# Patient Record
Sex: FEMALE | Employment: FULL TIME | URBAN - METROPOLITAN AREA
[De-identification: names, ages, dates, MRNs, and addresses within clinical notes are randomized per-mention and may not be internally consistent; named-entity substitution may affect disease eponyms.]

---

## 2017-04-15 DIAGNOSIS — Z30.41 USES ORAL CONTRACEPTION: ICD-10-CM

## 2017-04-17 RX ORDER — DROSPIRENONE AND ETHINYL ESTRADIOL 0.03MG-3MG
KIT ORAL
Qty: 28 TABLET | Refills: 0 | OUTPATIENT
Start: 2017-04-17

## 2017-04-17 NOTE — TELEPHONE ENCOUNTER
mark 3-0.03      Last Written Prescription Date:  3/17/17  Last Fill Quantity: 28,   # refills: 0  [place] [facility] [place]:  3/31/16  Future Office visit: none    Pt due for annual, already received a one month extension. Rx denied.

## 2017-04-26 RX ORDER — DROSPIRENONE AND ETHINYL ESTRADIOL 0.03MG-3MG
1 KIT ORAL DAILY
Qty: 84 TABLET | Refills: 0 | Status: SHIPPED | OUTPATIENT
Start: 2017-04-26 | End: 2017-06-01

## 2017-04-26 NOTE — TELEPHONE ENCOUNTER
CARMITA 3-0.03 MG per tablet      Last Written Prescription Date:  3/17/17  Last Fill Quantity: 28 tabs,   # refills: 0  Last Office Visit with [facility] primary care provider:  3/31/16-Annual  Future Office visit: 6/1/17    Pt has already received a one month extension for this Rx, however she did not have an appt scheduled at that time. Pt called today and scheduled appt for 6/1/17. Per [facility] Protocol, 3 month Rx sent to pharmacy (84 tabs/0rf).  Pt informed. She was supposed to start a new pack on Sunday. Informed that she can start the pack today, it may throw her cycle off by a couple days, and to use a back up method of birth control like condoms for a wk, to be cautious recommended use the back up method for a month. Pt verbalized understanding.      Closing encounter.

## 2017-04-26 NOTE — TELEPHONE ENCOUNTER
Patient called to get her refill of birth control. She made an appointment in May to see Dr. Tran. Please call her.

## 2017-05-14 ENCOUNTER — TRANSFERRED RECORDS (OUTPATIENT)
Dept: HEALTH INFORMATION MANAGEMENT | Facility: CLINIC | Age: 27
End: 2017-05-14

## 2017-06-01 ENCOUNTER — OFFICE VISIT (OUTPATIENT)
Dept: OBGYN | Facility: CLINIC | Age: 27
End: 2017-06-01
Payer: COMMERCIAL

## 2017-06-01 VITALS
BODY MASS INDEX: 20.83 KG/M2 | WEIGHT: 125 LBS | HEIGHT: 65 IN | SYSTOLIC BLOOD PRESSURE: 112 MMHG | DIASTOLIC BLOOD PRESSURE: 60 MMHG

## 2017-06-01 DIAGNOSIS — Z11.3 SCREEN FOR STD (SEXUALLY TRANSMITTED DISEASE): ICD-10-CM

## 2017-06-01 DIAGNOSIS — Z01.419 ENCOUNTER FOR GYNECOLOGICAL EXAMINATION WITHOUT ABNORMAL FINDING: Primary | ICD-10-CM

## 2017-06-01 DIAGNOSIS — Z71.85 COUNSELING FOR HPV (HUMAN PAPILLOMAVIRUS) VACCINATION: ICD-10-CM

## 2017-06-01 DIAGNOSIS — Z30.41 USES ORAL CONTRACEPTION: ICD-10-CM

## 2017-06-01 PROCEDURE — 99395 PREV VISIT EST AGE 18-39: CPT | Performed by: OBSTETRICS & GYNECOLOGY

## 2017-06-01 PROCEDURE — 87491 CHLMYD TRACH DNA AMP PROBE: CPT | Performed by: OBSTETRICS & GYNECOLOGY

## 2017-06-01 PROCEDURE — 87591 N.GONORRHOEAE DNA AMP PROB: CPT | Performed by: OBSTETRICS & GYNECOLOGY

## 2017-06-01 RX ORDER — DROSPIRENONE AND ETHINYL ESTRADIOL 0.03MG-3MG
1 KIT ORAL DAILY
Qty: 84 TABLET | Refills: 4 | Status: SHIPPED | OUTPATIENT
Start: 2017-06-01 | End: 2018-07-02

## 2017-06-01 ASSESSMENT — PATIENT HEALTH QUESTIONNAIRE - PHQ9: 5. POOR APPETITE OR OVEREATING: MORE THAN HALF THE DAYS

## 2017-06-01 ASSESSMENT — ANXIETY QUESTIONNAIRES
1. FEELING NERVOUS, ANXIOUS, OR ON EDGE: MORE THAN HALF THE DAYS
6. BECOMING EASILY ANNOYED OR IRRITABLE: MORE THAN HALF THE DAYS
3. WORRYING TOO MUCH ABOUT DIFFERENT THINGS: SEVERAL DAYS
2. NOT BEING ABLE TO STOP OR CONTROL WORRYING: SEVERAL DAYS
GAD7 TOTAL SCORE: 10
IF YOU CHECKED OFF ANY PROBLEMS ON THIS QUESTIONNAIRE, HOW DIFFICULT HAVE THESE PROBLEMS MADE IT FOR YOU TO DO YOUR WORK, TAKE CARE OF THINGS AT HOME, OR GET ALONG WITH OTHER PEOPLE: SOMEWHAT DIFFICULT
5. BEING SO RESTLESS THAT IT IS HARD TO SIT STILL: SEVERAL DAYS
7. FEELING AFRAID AS IF SOMETHING AWFUL MIGHT HAPPEN: SEVERAL DAYS

## 2017-06-01 NOTE — PROGRESS NOTES
"  Lorrie is a 27 year old  female who presents for annual exam.     Besides routine health maintenance, she has had a few irregular periods while on the pill.  Does contribute this to her work schedule and taking pill on a irregular schedule.          HPI:    Here today for yearly exam --doing well.  Fairly regular, monthly menses on Ana; typically light and 5-6d bleeding.  Occ breakthrough bleeding --often when she has missed pills but sometimes just randomly.  Has struggled at times to take pills regularily due to busy schedule.  Always using back up birth control/condoms.  +SA --no issues.  Has had some new partners this year.  No concerns.  Denies bowel/bladder issues.    Currently dating --has been with current partner for a few weeks; living at home but looking into moving out this summer to [facility] area  -currently working as [employer] in [place]; will start masters program at [employer] this  in biological sciences; hoping to complete in 2yrs  -staying active; dancing and going to the gym a few times per week  No PCP  -questions about cancer screening; mother had \"cancer\" in her 20-30s?  Unsure of type and age but will check to verify if/when screening should start if available  -has not had gardasil vaccine series --will consider    GYNECOLOGIC HISTORY:    Patient's last menstrual period was 2017.  Her current contraception method is: oral contraceptives.  She  reports that she has never smoked. She has never used smokeless tobacco.  Patient is sexually active.  STD testing offered?  Declined    Last PHQ-9 score on record =   PHQ-9 SCORE 2017   Total Score 5     Last GAD7 score on record =   JOYCE-7 SCORE 2017   Total Score 10     Alcohol Score = 5    HEALTH MAINTENANCE:  Cholesterol: no previous screening  Last Mammo: N/A, Result: not applicable  Pap:   Lab Results   Component Value Date    PAP NIL 2016   Colonoscopy:  N/A, Result: not applicable  Dexa:  Never  Health " "maintenance updated:  yes    HISTORY:  Obstetric History       T0      L0     SAB0   TAB0   Ectopic0   Multiple0   Live Births0           Patient Active Problem List   Diagnosis     Uses oral contraception     Acne     Past Surgical History:   Procedure Laterality Date     NO HISTORY OF SURGERY        Social History   Substance Use Topics     Smoking status: Never Smoker     Smokeless tobacco: Never Used     Alcohol use 0.0 oz/week     0 Standard drinks or equivalent per week      Problem (# of Occurrences) Relation (Name,Age of Onset)    HEART DISEASE (1) Maternal Grandfather    Ovarian Cancer (1) Maternal Grandmother            Current Outpatient Prescriptions   Medication Sig     drospirenone-ethinyl estradiol (CARMITA) 3-0.03 MG per tablet Take 1 tablet by mouth daily     [DISCONTINUED] drospirenone-ethinyl estradiol (CARMITA) 3-0.03 MG per tablet Take 1 tablet by mouth daily     No current facility-administered medications for this visit.      No Known Allergies    Past medical, surgical, social and family histories were reviewed and updated in EPIC.    ROS:   12 point review of systems negative other than symptoms noted below.  Skin: Acne  Psychiatric: Anxiety and [place]    EXAM:  /60  Ht 5' 4.5\" (1.638 m)  Wt 125 lb (56.7 kg)  LMP 2017  BMI 21.12 kg/m2   BMI: Body mass index is 21.12 kg/(m^2).    PHYSICAL EXAM:  Constitutional:  Appearance: Well nourished, well developed, alert, in no acute distress  Neck:  Lymph Nodes:  No lymphadenopathy present    Thyroid:  Gland size normal, nontender, no nodules or masses present  on palpation  Chest:  Respiratory Effort:  Breathing unlabored  Cardiovascular:    Heart: Auscultation:  Regular rate, normal rhythm, no murmurs present  Breasts: Inspection of Breasts:  No lymphadenopathy present    Palpation of Breasts and Axillae:  No masses present on palpation, no  breast tenderness    Axillary Lymph Nodes:  No lymphadenopathy " present  Gastrointestinal:   Abdominal Examination:  Abdomen nontender to palpation, tone normal without rigidity or guarding, no masses present, umbilicus without lesions   Liver and Spleen:  No hepatomegaly present, liver nontender to palpation    Hernias:  No hernias present  Lymphatic: Lymph Nodes:  No other lymphadenopathy present  Skin:  General Inspection:  No rashes present, no lesions present, no areas of  discoloration    Genitalia and Groin:  No rashes present, no lesions present, no areas of  discoloration, no masses present  Neurologic/Psychiatric:    Mental Status:  Oriented X3     Pelvic Exam:  External Genitalia:     Normal appearance for age, no discharge present, no tenderness present, no inflammatory lesions present, color normal  Vagina:     Normal vaginal vault without central or paravaginal defects, no discharge present, no inflammatory lesions present, no masses present  Bladder:     Nontender to palpation  Urethra:   Urethral Body:  Urethra palpation normal, urethra structural support normal   Urethral Meatus:  No erythema or lesions present  Cervix:     Appearance healthy, no lesions present, nontender to palpation, no bleeding present  Uterus:     Uterus: firm, normal sized and nontender, anteverted in position.   Adnexa:     No adnexal tenderness present, no adnexal masses present  Perineum:     Perineum within normal limits, no evidence of trauma, no rashes or skin lesions present  Anus:     Anus within normal limits, no hemorrhoids present  Inguinal Lymph Nodes:     No lymphadenopathy present  Pubic Hair:     Normal pubic hair distribution for age  Genitalia and Groin:     No rashes present, no lesions present, no areas of discoloration, no masses present    COUNSELING:   Reviewed preventive health counseling, as reflected in patient instructions  Special attention given to:        Regular exercise       Healthy diet/nutrition       Contraception       Safe sex practices/STD  prevention    BMI: Body mass index is 21.12 kg/(m^2).      ASSESSMENT:  27 year old female with satisfactory annual exam.    ICD-10-CM    1. Encounter for gynecological examination without abnormal finding Z01.419    2. Uses oral contraception Z30.41 drospirenone-ethinyl estradiol (CARMITA) 3-0.03 MG per tablet   3. Counseling for HPV (human papillomavirus) vaccination Z71.89    4. Screen for STD (sexually transmitted disease) Z11.3 Chlamydia trachomatis PCR     Neisseria gonorrhoeae PCR       PLAN:  Patient Instructions   Follow up with your primary care provider for your other medical problems.  Continue self breast exam.  Increase physical activity and exercise.  Lab results will be called to the patient.  Usual safety and preventative measures counseling done.  Gardasil discussed today.  Will review literature and call if interested.  Last pap smear (2016) was normal.  No pap was obtained this year.  This was discussed with the patient and she agrees with the plan.      Agustina Tran MD

## 2017-06-01 NOTE — PATIENT INSTRUCTIONS
Follow up with your primary care provider for your other medical problems.  Continue self breast exam.  Increase physical activity and exercise.  Lab results will be called to the patient.  Usual safety and preventative measures counseling done.  Gardasil discussed today.  Will review literature and call if interested.  Last pap smear (2016) was normal.  No pap was obtained this year.  This was discussed with the patient and she agrees with the plan.

## 2017-06-01 NOTE — MR AVS SNAPSHOT
After Visit Summary   6/1/2017    Lorrie Yuan    MRN: [id number]           Patient Information     Date Of Birth          1990        Visit Information        Provider Department      6/1/2017 11:00 AM Agustina Tran MD [facility]        Today's Diagnoses     Encounter for gynecological examination without abnormal finding    -  1    Uses oral contraception        Counseling for HPV (human papillomavirus) vaccination        Screen for STD (sexually transmitted disease)          Care Instructions    Follow up with your primary care provider for your other medical problems.  Continue self breast exam.  Increase physical activity and exercise.  Lab results will be called to the patient.  Usual safety and preventative measures counseling done.  Gardasil discussed today.  Will review literature and call if interested.  Last pap smear (2016) was normal.  No pap was obtained this year.  This was discussed with the patient and she agrees with the plan.          Follow-ups after your visit        Follow-up notes from your care team     Return in about 1 year (around 6/1/2018) for Annual Exam.      Who to contact     If you have questions or need follow up information about today's clinic visit or your schedule please contact [facility] directly at [phone].  Normal or non-critical lab and imaging results will be communicated to you by MyChart, letter or phone within 4 business days after the clinic has received the results. If you do not hear from us within 7 days, please contact the clinic through [employer] or phone. If you have a critical or abnormal lab result, we will notify you by phone as soon as possible.  Submit refill requests through [employer] or call your pharmacy and they will forward the refill request to us. Please allow 3 business days for your refill to be completed.          Additional Information About Your Visit        MyChart  "Information     ReplySend lets you send messages to your doctor, view your test results, renew your prescriptions, schedule appointments and more. To sign up, go to [email or web address] . Click on \"Log in\" on the left side of the screen, which will take you to the Welcome page. Then click on \"Sign up Now\" on the right side of the page.     You will be asked to enter the access code listed below, as well as some personal information. Please follow the directions to create your username and password.     Your access code is: [email or web address]  Expires: 2017 12:16 PM     Your access code will  in 90 days. If you need help or a new code, please call your [place] clinic or [phone].        Care EveryWhere ID     This is your Care EveryWhere ID. This could be used by other organizations to access your [place] medical records  [id number]        Your Vitals Were     Height Last Period BMI (Body Mass Index)             5' 4.5\" (1.638 m) 2017 21.12 kg/m2          Blood Pressure from Last 3 Encounters:   17 112/60   16 102/60   14 100/60    Weight from Last 3 Encounters:   17 125 lb (56.7 kg)   16 132 lb (59.9 kg)   14 126 lb (57.2 kg)              We Performed the Following     Chlamydia trachomatis PCR     Neisseria gonorrhoeae PCR          Where to get your medicines      These medications were sent to [facility] [id number] [address] [facility]  [address] [facility] [address]     Phone:  [phone]     drospirenone-ethinyl estradiol 3-0.03 MG per tablet          Primary Care Provider    None Specified       No primary provider on file.        Thank you!     Thank you for choosing [facility]  for your care. Our goal is always to provide you with excellent care. Hearing back from our patients is one way we can continue to improve our services. Please take a few minutes to complete the " written survey that you may receive in the mail after your visit with us. Thank you!             Your Updated Medication List - Protect others around you: Learn how to safely use, store and throw away your medicines at www.disposemymeds.org.          This list is accurate as of: 6/1/17 12:16 PM.  Always use your most recent med list.                   Brand Name Dispense Instructions for use    drospirenone-ethinyl estradiol 3-0.03 MG per tablet    CARMITA    84 tablet    Take 1 tablet by mouth daily

## 2017-06-02 LAB
C TRACH DNA SPEC QL NAA+PROBE: NORMAL
N GONORRHOEA DNA SPEC QL NAA+PROBE: NORMAL
SPECIMEN SOURCE: NORMAL
SPECIMEN SOURCE: NORMAL

## 2017-06-02 ASSESSMENT — PATIENT HEALTH QUESTIONNAIRE - PHQ9: SUM OF ALL RESPONSES TO PHQ QUESTIONS 1-9: 5

## 2017-06-02 ASSESSMENT — ANXIETY QUESTIONNAIRES: GAD7 TOTAL SCORE: 10

## 2017-12-12 ENCOUNTER — OFFICE VISIT (OUTPATIENT)
Dept: OBGYN | Facility: CLINIC | Age: 27
End: 2017-12-12
Payer: COMMERCIAL

## 2017-12-12 VITALS
BODY MASS INDEX: 21.16 KG/M2 | DIASTOLIC BLOOD PRESSURE: 54 MMHG | WEIGHT: 127 LBS | HEIGHT: 65 IN | SYSTOLIC BLOOD PRESSURE: 92 MMHG

## 2017-12-12 DIAGNOSIS — N94.10 FEMALE DYSPAREUNIA: ICD-10-CM

## 2017-12-12 DIAGNOSIS — Z11.3 SCREEN FOR STD (SEXUALLY TRANSMITTED DISEASE): Primary | ICD-10-CM

## 2017-12-12 DIAGNOSIS — Z11.8 SCREENING FOR CHLAMYDIAL DISEASE: ICD-10-CM

## 2017-12-12 PROCEDURE — 99213 OFFICE O/P EST LOW 20 MIN: CPT | Performed by: OBSTETRICS & GYNECOLOGY

## 2017-12-12 PROCEDURE — 87591 N.GONORRHOEAE DNA AMP PROB: CPT | Performed by: OBSTETRICS & GYNECOLOGY

## 2017-12-12 PROCEDURE — 87491 CHLMYD TRACH DNA AMP PROBE: CPT | Performed by: OBSTETRICS & GYNECOLOGY

## 2017-12-12 NOTE — MR AVS SNAPSHOT
"              After Visit Summary   12/12/2017    Lorrie Yuan    MRN: [id number]           Patient Information     Date Of Birth          1990        Visit Information        Provider Department      12/12/2017 3:30 PM Noemy Nolan MD [facility]        Today's Diagnoses     Screen for STD (sexually transmitted disease)    -  1    Screening for chlamydial disease        Female dyspareunia           Follow-ups after your visit        Follow-up notes from your care team     Return if symptoms worsen or fail to improve.      Who to contact     If you have questions or need follow up information about today's clinic visit or your schedule please contact [facility] directly at [phone].  Normal or non-critical lab and imaging results will be communicated to you by MyChart, letter or phone within 4 business days after the clinic has received the results. If you do not hear from us within 7 days, please contact the clinic through MyChart or phone. If you have a critical or abnormal lab result, we will notify you by phone as soon as possible.  Submit refill requests through [employer] or call your pharmacy and they will forward the refill request to us. Please allow 3 business days for your refill to be completed.          Additional Information About Your Visit        MyChart Information     [employer] lets you send messages to your doctor, view your test results, renew your prescriptions, schedule appointments and more. To sign up, go to [email or web address] . Click on \"Log in\" on the left side of the screen, which will take you to the Welcome page. Then click on \"Sign up Now\" on the right side of the page.     You will be asked to enter the access code listed below, as well as some personal information. Please follow the directions to create your username and password.     Your access code is: [email or web address]  Expires: 3/12/2018  3:43 PM     Your access " "code will  in 90 days. If you need help or a new code, please call your [place] clinic or [phone].        Care EveryWhere ID     This is your Care EveryWhere ID. This could be used by other organizations to access your [place] medical records  [id number]        Your Vitals Were     Height Last Period BMI (Body Mass Index)             5' 4.5\" (1.638 m) 2017 21.46 kg/m2          Blood Pressure from Last 3 Encounters:   17 92/54   17 112/60   16 102/60    Weight from Last 3 Encounters:   17 127 lb (57.6 kg)   17 125 lb (56.7 kg)   16 132 lb (59.9 kg)              We Performed the Following     CHLAMYDIA TRACHOMATIS PCR     NEISSERIA GONORRHOEA PCR        Primary Care Provider Fax #    Physician No Ref-Primary [phone]       No address on file        Equal Access to Services     KELLY IBARRA : Hadii diana welsho Sogiovanny, waaxda luqadaha, qaybta kaalmada adeegyada, rosalba gasca . So [facility] [phone].    ATENCIÓN: Si jens wilks, tiene a mcleod disposición servicios gratuitos de asistencia lingüística. Llame al [phone].    We comply with applicable federal civil rights laws and [place] laws. We do not discriminate on the basis of race, color, national origin, age, disability, sex, sexual orientation, or gender identity.            Thank you!     Thank you for choosing [facility]  for your care. Our goal is always to provide you with excellent care. Hearing back from our patients is one way we can continue to improve our services. Please take a few minutes to complete the written survey that you may receive in the mail after your visit with us. Thank you!             Your Updated Medication List - Protect others around you: Learn how to safely use, store and throw away your medicines at www.disposemymeds.org.          This list is accurate as of: 17  5:23 PM.  Always use your most recent med list.    "                Brand Name Dispense Instructions for use Diagnosis    drospirenone-ethinyl estradiol 3-0.03 MG per tablet    CARMITA    84 tablet    Take 1 tablet by mouth daily    Uses oral contraception

## 2017-12-12 NOTE — PROGRESS NOTES
SUBJECTIVE:                                                   Lorrie Yuan is a 27 year old female who presents to clinic today for the following health issue(s):  Patient presents with:  STD: patient requesting screening        HPI:  Lorrie presents for GC/CT testing. She has no vaginal discharge but she has a new partner. She is hesitant to have serological STD testing done today. She states that she has continued to have painful intercourse on initial penetration. This occurred with other partners as well. Sometimes it is only on initial penetration but sometimes it is also throughout intercourse as well. She states that she also has been experiencing vaginal dryness as well.    Patient's last menstrual period was 2017..   Patient is sexually active, .  Using oral contraceptives for contraception.    reports that she has never smoked. She has never used smokeless tobacco.  STD testing offered?  Accepted  Health maintenance updated:  yes    Today's PHQ-2 Score: No flowsheet data found.  Today's PHQ-9 Score:   PHQ-9 SCORE 2017   Total Score 5     Today's JOYCE-7 Score:   JOYCE-7 SCORE 2017   Total Score 10       Problem list and histories reviewed & adjusted, as indicated.  Additional history: as documented.    Patient Active Problem List   Diagnosis     Uses oral contraception     Acne     Past Surgical History:   Procedure Laterality Date     NO HISTORY OF SURGERY        Social History   Substance Use Topics     Smoking status: Never Smoker     Smokeless tobacco: Never Used     Alcohol use 0.0 oz/week     0 Standard drinks or equivalent per week      Problem (# of Occurrences) Relation (Name,Age of Onset)    HEART DISEASE (1) Maternal Grandfather    Ovarian Cancer (1) Maternal Grandmother            Current Outpatient Prescriptions   Medication Sig     drospirenone-ethinyl estradiol (CARMITA) 3-0.03 MG per tablet Take 1 tablet by mouth daily     No current facility-administered  "medications for this visit.      No Known Allergies    ROS:  12 point review of systems negative other than symptoms noted below.  Genitourinary: Painful [place]    OBJECTIVE:     BP 92/54  Ht 5' 4.5\" (1.638 m)  Wt 127 lb (57.6 kg)  LMP 12/08/2017  BMI 21.46 kg/m2  Body mass index is 21.46 kg/(m^2).    Exam:  Constitutional:  Appearance: Well nourished, well developed alert, in no acute distress  Chest:  Respiratory Effort:  Breathing unlabored  Cardiovascular: well perfused extremities, no peripheral edema  Gastrointestinal:  Abdominal Examination:  Abdomen nontender to palpation, tone normal without rigidity or guarding, no masses present, umbilicus without lesions; Liver/Spleen:  No hepatomegaly present, liver nontender to palpation; Hernias:  No hernias present  Neurologic/Psychiatric:  Mental Status:  Oriented X3    Pelvic Exam  Normal external genitalia  No visible lesions externally  Vagina: no abnormal vaginal discharge noted. Tender to palpation of posterior vaginal wall. No anterior vaginal wall tenderness  Cervix: appears normal without lesions, GC/CT collected. No purulent discharge noted. No cervical motion tenderness  Uterus: non-tender on bimanual. Mobile anteverted.  Adnexa: non-tender. No masses    In-Clinic Test Results:  No results found for this or any previous visit (from the past 24 hour(s)).    ASSESSMENT/PLAN:                                                        ICD-10-CM    1. Screen for STD (sexually transmitted disease) Z11.3 NEISSERIA GONORRHOEA PCR   2. Screening for chlamydial disease Z11.8 CHLAMYDIA TRACHOMATIS PCR   3. Female dyspareunia N94.10      Recommendation was given for serological STI testing but she declines at this time.  HPV vaccination was recommended but she also declines  Discussed proper use of vaginal lubrication. Exam showed pain on palpation of pelvic floor which is likely reactionary from painful intercourse. This was explained to Lorrie. Pelvic floor " PT was discussed as an option if the discomfort persists despite lubricant use.      Noemy Nolan MD  [facility]

## 2017-12-14 LAB
C TRACH DNA SPEC QL NAA+PROBE: NEGATIVE
N GONORRHOEA DNA SPEC QL NAA+PROBE: NEGATIVE
SPECIMEN SOURCE: NORMAL
SPECIMEN SOURCE: NORMAL

## 2018-04-06 ENCOUNTER — OFFICE VISIT (OUTPATIENT)
Dept: OBGYN | Facility: CLINIC | Age: 28
End: 2018-04-06
Payer: COMMERCIAL

## 2018-04-06 VITALS — DIASTOLIC BLOOD PRESSURE: 66 MMHG | WEIGHT: 129 LBS | SYSTOLIC BLOOD PRESSURE: 106 MMHG | BODY MASS INDEX: 21.8 KG/M2

## 2018-04-06 DIAGNOSIS — B37.31 YEAST VAGINITIS: ICD-10-CM

## 2018-04-06 DIAGNOSIS — N89.8 VAGINAL IRRITATION: Primary | ICD-10-CM

## 2018-04-06 LAB
SPECIMEN SOURCE: ABNORMAL
WET PREP SPEC: ABNORMAL

## 2018-04-06 PROCEDURE — 87210 SMEAR WET MOUNT SALINE/INK: CPT | Performed by: OBSTETRICS & GYNECOLOGY

## 2018-04-06 PROCEDURE — 99213 OFFICE O/P EST LOW 20 MIN: CPT | Performed by: OBSTETRICS & GYNECOLOGY

## 2018-04-06 RX ORDER — MICONAZOLE NITRATE 2 %
1 CREAM WITH APPLICATOR VAGINAL AT BEDTIME
Qty: 45 G | Refills: 1 | Status: SHIPPED | OUTPATIENT
Start: 2018-04-06 | End: 2018-08-07

## 2018-04-06 NOTE — MR AVS SNAPSHOT
"              After Visit Summary   4/6/2018    Lorrie Yuan    MRN: [id number]           Patient Information     Date Of Birth          1990        Visit Information        Provider Department      4/6/2018 2:30 PM Agustina Tran MD [facility]        Today's Diagnoses     Vaginal irritation    -  1    Yeast vaginitis          Care Instructions    Wet prep results reviewed; will treat with 7d course of topical medication.            Follow-ups after your visit        Follow-up notes from your care team     Return if symptoms worsen or fail to improve.      Who to contact     If you have questions or need follow up information about today's clinic visit or your schedule please contact [facility] directly at [phone].  Normal or non-critical lab and imaging results will be communicated to you by MyChart, letter or phone within 4 business days after the clinic has received the results. If you do not hear from us within 7 days, please contact the clinic through [email or web address] or phone. If you have a critical or abnormal lab result, we will notify you by phone as soon as possible.  Submit refill requests through [employer] or call your pharmacy and they will forward the refill request to us. Please allow 3 business days for your refill to be completed.          Additional Information About Your Visit        MyChart Information     [facility] [facility] lets you send messages to your doctor, view your test results, renew your prescriptions, schedule appointments and more. To sign up, go to [email or web address] . Click on \"Log in\" on the left side of the screen, which will take you to the Welcome page. Then click on \"Sign up Now\" on the right side of the page.     You will be asked to enter the access code listed below, as well as some personal information. Please follow the directions to create your username and password.     Your access code is: [email or web address]  Expires: 7/5/2018  " 4:59 PM     Your access code will  in 90 days. If you need help or a new code, please call your [facility] clinic or [phone].        Care EveryWhere ID     This is your Care EveryWhere ID. This could be used by other organizations to access your [facility] medical records  [id number]        Your Vitals Were     Last Period Breastfeeding? BMI (Body Mass Index)             2018 (Exact Date) No 21.8 kg/m2          Blood Pressure from Last 3 Encounters:   18 106/66   17 92/54   17 112/60    Weight from Last 3 Encounters:   18 129 lb (58.5 kg)   17 127 lb (57.6 kg)   17 125 lb (56.7 kg)              We Performed the Following     Wet  Prep          Today's Medication Changes          These changes are accurate as of 18  4:59 PM.  If you have any questions, ask your nurse or doctor.               Start taking these medicines.        Dose/Directions    miconazole 2 % cream   Commonly known as:  [facility] MICONAZOLE 7   Used for:  Yeast vaginitis   Started by:  Agustina Tran MD        Dose:  1 applicator   Place 1 applicator vaginally At Bedtime   Quantity:  45 g   Refills:  1            Where to get your medicines      These medications were sent to [facility] [address] [facility] [address] AT [facility] [address] [facility] [address]     Phone:  [phone]     miconazole 2 % cream                Primary Care Provider Office Phone # Fax #    [facility] [place] [facility] [phone]       [facility] [place] [address] [facility] [address]  [facility] [address]        Equal Access to Services     KELLY IBARRA AH: Denzel June, devante cohen, donald mo, rosalba whitman. So [facility] [phone].    ATENCIÓN: Si habla español, tiene a mcleod disposición servicios gratuitos de asistencia lingüística. Llame al [phone].    We comply  with applicable federal civil rights laws and [place] laws. We do not discriminate on the basis of race, color, national origin, age, disability, sex, sexual orientation, or gender identity.            Thank you!     Thank you for choosing [facility]  for your care. Our goal is always to provide you with excellent care. Hearing back from our patients is one way we can continue to improve our services. Please take a few minutes to complete the written survey that you may receive in the mail after your visit with us. Thank you!             Your Updated Medication List - Protect others around you: Learn how to safely use, store and throw away your medicines at www.disposemymeds.org.          This list is accurate as of 4/6/18  4:59 PM.  Always use your most recent med list.                   Brand Name Dispense Instructions for use Diagnosis    drospirenone-ethinyl estradiol 3-0.03 MG per tablet    CARMITA    84 tablet    Take 1 tablet by mouth daily    Uses oral contraception       miconazole 2 % cream    CVS MICONAZOLE 7    45 g    Place 1 applicator vaginally At Bedtime    Yeast vaginitis

## 2018-04-06 NOTE — PROGRESS NOTES
SUBJECTIVE:                                                   Lorrie Yuan is a 27 year old female who presents to clinic today for the following health issue(s):  Patient presents with:  Vaginal Problem: vaginal discharge, itching, painful urination since wednesday        HPI:  Here today for possible vaginal infection.  Finished her menses on Tuesday and started noticing some external irritation on Wednesday.  +itching/burning.  Not much discharge but hard to tell since she just finished menses.  No pain/burning with urination --painful when urine comes into contact with vulvar tissue.   No self or OTC treatments  No new products; no new soaps/creams.  Wears tampons and then liner on last day or so of menses  Monogamous relationship since 2017    Patient's last menstrual period was 2018 (exact date)..   Patient is sexually active, .  Using oral contraceptives for contraception.    reports that she has never smoked. She has never used smokeless tobacco.    STD testing offered?  Declined    Health maintenance updated:  yes    Today's PHQ-2 Score: No flowsheet data found.  Today's PHQ-9 Score:   PHQ-9 SCORE 2017   Total Score 5     Today's JOYCE-7 Score:   JOYCE-7 SCORE 2017   Total Score 10       Problem list and histories reviewed & adjusted, as indicated.  Additional history: as documented.    Patient Active Problem List   Diagnosis     Uses oral contraception     Acne     Past Surgical History:   Procedure Laterality Date     NO HISTORY OF SURGERY        Social History   Substance Use Topics     Smoking status: Never Smoker     Smokeless tobacco: Never Used     Alcohol use 0.0 oz/week     0 Standard drinks or equivalent per week      Problem (# of Occurrences) Relation (Name,Age of Onset)    HEART DISEASE (1) Maternal Grandfather    Ovarian Cancer (1) Maternal Grandmother            Current Outpatient Prescriptions   Medication Sig     miconazole (CVS MICONAZOLE 7) 2 % cream Place 1  applicator vaginally At Bedtime     drospirenone-ethinyl estradiol (CARMITA) 3-0.03 MG per tablet Take 1 tablet by mouth daily     No current facility-administered medications for this visit.      No Known Allergies    ROS:  Genitourinary: Painful Urination, Vaginal Discharge and Vaginal Itching    OBJECTIVE:     /66  Wt 129 lb (58.5 kg)  LMP 03/31/2018 (Exact Date)  Breastfeeding? No  BMI 21.8 kg/m2  Body mass index is 21.8 kg/(m^2).    Exam:  Constitutional:  Appearance: Well nourished, well developed alert, in no acute distress  Skin:General Inspection:  No rashes present, no lesions present, no areas of discoloration; Genitalia and Groin:  No rashes present, no lesions present, no areas of discoloration, no masses present.  Neurologic/Psychiatric:  Mental Status:  Oriented X3   Pelvic Exam:  External Genitalia:     Normal appearance for age, no discharge present, no tenderness present, no inflammatory lesions present, color normal; +REDNESS/IRRITATION OF VULVA; BILATERAL LABIA AND POSTERIOR FOURCHETTE; +ERYTHEMATOUS PATCHES/SATELLITE LESIONS; +THICK WHITE ADHERENT DISCHARGE  Vagina:     Normal vaginal vault without central or paravaginal defects, no discharge present, no inflammatory lesions present, no masses present; SMALL AMOUNT WHITE/YELLOW DISCHARGE  Bladder:     Nontender to palpation  Urethra:   Urethral Body:  Urethra palpation normal, urethra structural support normal   Urethral Meatus:  No erythema or lesions present  Cervix:     Appearance healthy, no lesions present, nontender to palpation, no bleeding present  Uterus:     DEFERRED  Adnexa:     DEFERRED  Perineum:     Perineum within normal limits, no evidence of trauma, no rashes or skin lesions present  Anus:     Anus within normal limits, no hemorrhoids present  Inguinal Lymph Nodes:     No lymphadenopathy present  Pubic Hair:     Normal pubic hair distribution for age  Genitalia and Groin:     No rashes present, no lesions present, no  areas of discoloration, no masses present       In-Clinic Test Results:  Results for orders placed or performed in visit on 04/06/18 (from the past 24 hour(s))   Wet  Prep   Result Value Ref Range    Specimen Description Vagina     Wet Prep No Trichomonas seen     Wet Prep No clue cells seen     Wet Prep Yeast seen (A)        ASSESSMENT/PLAN:                                                        ICD-10-CM    1. Vaginal irritation N89.8 Wet  Prep   2. Yeast vaginitis B37.3 miconazole (CVS MICONAZOLE 7) 2 % cream       Patient Instructions   Wet prep results reviewed; will treat with 7d course of topical medication.        Agustina Tran MD  [facility] [place]

## 2018-04-09 ENCOUNTER — TELEPHONE (OUTPATIENT)
Dept: NURSING | Facility: CLINIC | Age: 28
End: 2018-04-09

## 2018-04-09 NOTE — TELEPHONE ENCOUNTER
Pt seen 4/6/18 for vaginal irritation. Started on miconazole (CVS MICONAZOLE 7) 2 % cream on friday. Pt woke this morning with sharp RLQ abd pain. States area is tender to touch.  Pt also had large amount of white clumpy discharge. Denies any smell to discharge. Pt denies itching or pain with urination. Pt wants to know if OK to continue taking med or what she should do. Routing to Dr. Meneses, on call.

## 2018-04-09 NOTE — TELEPHONE ENCOUNTER
Contacted the pt. Reviewed Dr Meneses note below. Pt voices understanding. She said the pain has lessed since this am. She will contact the clinic if her symptoms do not improve by Wedneday.

## 2018-04-09 NOTE — TELEPHONE ENCOUNTER
Continue treatment as prescribed. RLQ pain not likely related to treatment but incidental. Monitor, treat symptomatically with heat/otc meds.    Felicity Nuñez Masters, DO

## 2018-05-11 ENCOUNTER — TELEPHONE (OUTPATIENT)
Dept: OBGYN | Facility: CLINIC | Age: 28
End: 2018-05-11

## 2018-05-11 NOTE — TELEPHONE ENCOUNTER
Pt called regarding a missed period while on OCP's. LMP: 3/31/2018, missed a few days here and there on her OCP this last month and has been stressed with school and over her last final Friday. She missed her period 4/30/2018, took a UPT last week and then Friday started with low abdominal pain similar to menstrual cramps which continues today along with breast tenderness and still without a period.     Recommended she take another UPT in the morning. May try heat and/or take Tylenol for discomfort, but may try ibuprofen if UPT negative. Missed period may be a result of the inconsistency in her OCP's last month and the stress she is experiencing. If UPT is negative, and pain persists and/or worsens, she may call back. If UPT is positive, these sx's could certainly be a result of the pregnancy.    She verbalized understanding, no further questions at this time.    Josi AVILA RN

## 2018-05-14 ENCOUNTER — TELEPHONE (OUTPATIENT)
Dept: OBGYN | Facility: CLINIC | Age: 28
End: 2018-05-14

## 2018-05-14 NOTE — TELEPHONE ENCOUNTER
Would like a call back from nurse Josi - spoke with her on Friday - calling with further questions

## 2018-05-14 NOTE — TELEPHONE ENCOUNTER
Pt called back in regards to our conversation from 5/11/2018. She took a repeat UPT on Saturday morning which was positive.  Her LMP: 3/30/2018 places her at 6w3d. Has been nauseated and vomiting a few times and inquiring what she can take with pregnancy. Informed of Vit B6 and Unisom regime per the [facility] Taking Meds during pregnancy. Encouraged protein, fiber and fluids. Discussed things to avoid for a healthy pregnancy. She had lots of appropriate questions which she verbalized understanding.     [facility] and spoke with someone for an hour on her options as she and her boyfriend are unsure if they will continue with the pregnancy as this is an unplanned pregnancy. Support provided by this nurse as pt became tearful. She is taking the week to decide.    Informed to make her first OB appt at 8-10 weeks of pregnancy by calling the office if she plans to go forward with pregnancy. No further questions/concerns at this time.  Josi AVILA RN

## 2018-07-02 DIAGNOSIS — Z30.41 USES ORAL CONTRACEPTION: ICD-10-CM

## 2018-07-02 NOTE — TELEPHONE ENCOUNTER
"Patient called [facility] after clinic hours to say \"she is NOT pregnant\". Phone number [phone].  Maria G Flores RN  [facility]      "

## 2018-07-02 NOTE — TELEPHONE ENCOUNTER
Pt has appointment for her annual 8/7/18. Tried to call pt to confirm that she is not pregnant. Mailbox is full not able to leave message. Will try later.

## 2018-07-03 RX ORDER — DROSPIRENONE AND ETHINYL ESTRADIOL 0.03MG-3MG
1 KIT ORAL DAILY
Qty: 84 TABLET | Refills: 0 | Status: SHIPPED | OUTPATIENT
Start: 2018-07-03 | End: 2018-08-07

## 2018-07-03 NOTE — TELEPHONE ENCOUNTER
"Requested Prescriptions   Pending Prescriptions Disp Refills     drospirenone-ethinyl estradiol (CARMITA) 3-0.03 MG per tablet 84 tablet 0     Sig: Take 1 tablet by mouth daily  Last Written Prescription Date:  6/1/17  Last Fill Quantity: 84,  # refills: 4   Last office visit: 4/6/2018 with prescribing provider:  Dr. Tran   Future Office Visit:   Next 5 appointments (look out 90 days)     Aug 07, 2018  3:00 PM CDT   PHYSICAL with Agustina Tran MD   [facility]    [address] [place] [id number]   [phone]                     Contraceptives Protocol Passed    7/3/2018  2:31 PM       Passed - Patient is not a current smoker if age is 35 or older       Passed - Recent (12 mo) or future (30 days) visit within the authorizing provider's specialty    Patient had office visit in the last 12 months or has a visit in the next 30 days with authorizing provider or within the authorizing provider's specialty.  See \"Patient Info\" tab in inbasket, or \"Choose Columns\" in Meds & Orders section of the refill encounter.           Passed - No active pregnancy on record       Passed - No positive pregnancy test in past 12 months        Prescription approved per [facility] Refill Protocol, with no further refills. Annual scheduled.  "

## 2018-08-07 ENCOUNTER — OFFICE VISIT (OUTPATIENT)
Dept: OBGYN | Facility: CLINIC | Age: 28
End: 2018-08-07
Payer: COMMERCIAL

## 2018-08-07 VITALS
HEIGHT: 64 IN | SYSTOLIC BLOOD PRESSURE: 98 MMHG | BODY MASS INDEX: 20.49 KG/M2 | WEIGHT: 120 LBS | HEART RATE: 68 BPM | DIASTOLIC BLOOD PRESSURE: 64 MMHG

## 2018-08-07 DIAGNOSIS — Z30.41 USES ORAL CONTRACEPTION: ICD-10-CM

## 2018-08-07 DIAGNOSIS — Z01.419 ENCOUNTER FOR GYNECOLOGICAL EXAMINATION WITHOUT ABNORMAL FINDING: Primary | ICD-10-CM

## 2018-08-07 PROCEDURE — 99395 PREV VISIT EST AGE 18-39: CPT | Performed by: OBSTETRICS & GYNECOLOGY

## 2018-08-07 RX ORDER — DROSPIRENONE AND ETHINYL ESTRADIOL 0.03MG-3MG
1 KIT ORAL DAILY
Qty: 84 TABLET | Refills: 4 | Status: SHIPPED | OUTPATIENT
Start: 2018-08-07 | End: 2019-08-11

## 2018-08-07 ASSESSMENT — ANXIETY QUESTIONNAIRES
3. WORRYING TOO MUCH ABOUT DIFFERENT THINGS: NOT AT ALL
2. NOT BEING ABLE TO STOP OR CONTROL WORRYING: NOT AT ALL
1. FEELING NERVOUS, ANXIOUS, OR ON EDGE: NOT AT ALL
6. BECOMING EASILY ANNOYED OR IRRITABLE: NOT AT ALL
5. BEING SO RESTLESS THAT IT IS HARD TO SIT STILL: NOT AT ALL
GAD7 TOTAL SCORE: 0
7. FEELING AFRAID AS IF SOMETHING AWFUL MIGHT HAPPEN: NOT AT ALL

## 2018-08-07 ASSESSMENT — PATIENT HEALTH QUESTIONNAIRE - PHQ9: 5. POOR APPETITE OR OVEREATING: NOT AT ALL

## 2018-08-07 NOTE — PROGRESS NOTES
Lorrie is a 28 year old  female who presents for annual exam.     Besides routine health maintenance, she has no other health concerns today .    HPI:  Here today for yearly exam --doing well.  Regular, monthly menses with mark OCP.  Light menses and minimal cramping.  No intermenstrual bleeding/spotting.  Did have unplanned pregnancy in early May --opted for termination.  Procedure went well --did not have  menses but had normal period last month.  +SA --no issues/concerns.  Declines STD screening today.  Denies bowel/bladder issues.    Dating; works as /assistant at [employer]  -staying active; dancing twice per week and generally quite active at work  +SBE on occ --no concerns  -originally from [place]; moved with her parents in ; traveled to [place] [place] in  x 10d --had great trip!      GYNECOLOGIC HISTORY:    Patient's last menstrual period was 2018.  Her current contraception method is: oral contraceptives.  She  reports that she has never smoked. She has never used smokeless tobacco.    Patient is sexually active.  STD testing offered?  Declined  Last PHQ-9 score on record =   PHQ-9 SCORE 2018   Total Score 2     Last GAD7 score on record =   JOYCE-7 SCORE 2018   Total Score 0     Alcohol Score = 3    HEALTH MAINTENANCE:  Cholesterol: never  Last Mammo: never, Result: not applicable, Next Mammo:  Age 42  Pap:   Lab Results   Component Value Date    PAP NIL 2016      Colonoscopy:  never, Result: not applicable, Next Colonoscopy: age 52.  Dexa:  never    Health maintenance updated:  yes    HISTORY:  Obstetric History       T0      L0     SAB0   TAB0   Ectopic0   Multiple0   Live Births0       # Outcome Date GA Lbr Kenny/2nd Weight Sex Delivery Anes PTL Lv   1 AB                   Patient Active Problem List   Diagnosis     Uses oral contraception     Acne     Past Surgical History:   Procedure Laterality Date     NO HISTORY OF  "SURGERY        Social History   Substance Use Topics     Smoking status: Never Smoker     Smokeless tobacco: Never Used     Alcohol use 0.0 oz/week     0 Standard drinks or equivalent per week      Problem (# of Occurrences) Relation (Name,Age of Onset)    HEART DISEASE (1) Maternal Grandfather    Ovarian Cancer (1) Maternal Grandmother            Current Outpatient Prescriptions   Medication Sig     drospirenone-ethinyl estradiol (CARMITA) 3-0.03 MG per tablet Take 1 tablet by mouth daily     [DISCONTINUED] drospirenone-ethinyl estradiol (CARMITA) 3-0.03 MG per tablet Take 1 tablet by mouth daily     No current facility-administered medications for this visit.      No Known Allergies    Past medical, surgical, social and family histories were reviewed and updated in EPIC.    ROS:   12 point review of systems negative other than symptoms noted below.    EXAM:  BP 98/64  Pulse 68  Ht 5' 4\" (1.626 m)  Wt 120 lb (54.4 kg)  LMP 07/13/2018  BMI 20.6 kg/m2   BMI: Body mass index is 20.6 kg/(m^2).    PHYSICAL EXAM:  Constitutional:  Appearance: Well nourished, well developed, alert, in no acute distress  Neck:  Lymph Nodes:  No lymphadenopathy present    Thyroid:  Gland size normal, nontender, no nodules or masses present  on palpation  Chest:  Respiratory Effort:  Breathing unlabored  Cardiovascular:    Heart: Auscultation:  Regular rate, normal rhythm, no murmurs present  Breasts: Inspection of Breasts:  No lymphadenopathy present., Palpation of Breasts and Axillae:  No masses present on palpation, no breast tenderness., Axillary Lymph Nodes:  No lymphadenopathy present. and No nodularity, asymmetry or nipple discharge bilaterally.  Gastrointestinal:   Abdominal Examination:  Abdomen nontender to palpation, tone normal without rigidity or guarding, no masses present, umbilicus without lesions   Liver and Spleen:  No hepatomegaly present, liver nontender to palpation    Hernias:  No hernias present  Lymphatic: Lymph " Nodes:  No other lymphadenopathy present  Skin:  General Inspection:  No rashes present, no lesions present, no areas of  discoloration    Genitalia and Groin:  No rashes present, no lesions present, no areas of  discoloration, no masses present  Neurologic/Psychiatric:    Mental Status:  Oriented X3     Pelvic Exam:  External Genitalia:     Normal appearance for age, no discharge present, no tenderness present, no inflammatory lesions present, color normal  Vagina:     Normal vaginal vault without central or paravaginal defects, no discharge present, no inflammatory lesions present, no masses present  Bladder:     Nontender to palpation  Urethra:   Urethral Body:  Urethra palpation normal, urethra structural support normal   Urethral Meatus:  No erythema or lesions present  Cervix:     Appearance healthy, no lesions present, nontender to palpation, no bleeding present  Uterus:     Uterus: firm, normal sized and nontender, anteverted in position.   Adnexa:     No adnexal tenderness present, no adnexal masses present  Perineum:     Perineum within normal limits, no evidence of trauma, no rashes or skin lesions present  Anus:     Anus within normal limits, no hemorrhoids present  Inguinal Lymph Nodes:     No lymphadenopathy present  Pubic Hair:     Normal pubic hair distribution for age  Genitalia and Groin:     No rashes present, no lesions present, no areas of discoloration, no masses present      COUNSELING:   Reviewed preventive health counseling, as reflected in patient instructions  Special attention given to:        Regular exercise       Healthy diet/nutrition       Contraception       Safe sex practices/STD prevention    BMI: Body mass index is 20.6 kg/(m^2).      ASSESSMENT:  28 year old female with satisfactory annual exam.    ICD-10-CM    1. Encounter for gynecological examination without abnormal finding Z01.419    2. Uses oral contraception Z30.41 drospirenone-ethinyl estradiol (CARMITA) 3-0.03 MG per  tablet       PLAN:  Patient Instructions   Follow up with your primary care provider for your other medical problems.  Continue self breast exam.  Increase physical activity and exercise.  Usual safety and preventative measures counseling done.  Last pap smear (2016) was normal.  No pap was obtained this year.  This was discussed with the patient and she agrees with the plan.      Agustina Tran MD

## 2018-08-07 NOTE — PATIENT INSTRUCTIONS
Follow up with your primary care provider for your other medical problems.  Continue self breast exam.  Increase physical activity and exercise.  Usual safety and preventative measures counseling done.  Last pap smear (2016) was normal.  No pap was obtained this year.  This was discussed with the patient and she agrees with the plan.

## 2018-08-07 NOTE — MR AVS SNAPSHOT
"              After Visit Summary   8/7/2018    Lorrie Yuan    MRN: [id number]           Patient Information     Date Of Birth          1990        Visit Information        Provider Department      8/7/2018 3:00 PM Agustina Tran MD [facility]        Today's Diagnoses     Encounter for gynecological examination without abnormal finding    -  1    Uses oral contraception          Care Instructions    Follow up with your primary care provider for your other medical problems.  Continue self breast exam.  Increase physical activity and exercise.  Usual safety and preventative measures counseling done.  Last pap smear (2016) was normal.  No pap was obtained this year.  This was discussed with the patient and she agrees with the plan.          Follow-ups after your visit        Follow-up notes from your care team     Return in about 1 year (around 8/7/2019) for Annual Exam.      Who to contact     If you have questions or need follow up information about today's clinic visit or your schedule please contact [facility] directly at [phone].  Normal or non-critical lab and imaging results will be communicated to you by EngineLabhart, letter or phone within 4 business days after the clinic has received the results. If you do not hear from us within 7 days, please contact the clinic through BoardProspectst or phone. If you have a critical or abnormal lab result, we will notify you by phone as soon as possible.  Submit refill requests through galaxyadvisors or call your pharmacy and they will forward the refill request to us. Please allow 3 business days for your refill to be completed.          Additional Information About Your Visit        MyChart Information     galaxyadvisors lets you send messages to your doctor, view your test results, renew your prescriptions, schedule appointments and more. To sign up, go to [email or web address] . Click on \"Log in\" on the left side of the screen, " "which will take you to the Welcome page. Then click on \"Sign up Now\" on the right side of the page.     You will be asked to enter the access code listed below, as well as some personal information. Please follow the directions to create your username and password.     Your access code is: [email or web address]  Expires: 2018  2:56 PM     Your access code will  in 90 days. If you need help or a new code, please call your [place] clinic or [phone].        Care EveryWhere ID     This is your Care EveryWhere ID. This could be used by other organizations to access your [place] medical records  [id number]        Your Vitals Were     Pulse Height Last Period BMI (Body Mass Index)          68 5' 4\" (1.626 m) 2018 20.6 kg/m2         Blood Pressure from Last 3 Encounters:   18 98/64   18 106/66   17 92/54    Weight from Last 3 Encounters:   18 120 lb (54.4 kg)   18 129 lb (58.5 kg)   17 127 lb (57.6 kg)              Today, you had the following     No orders found for display         Where to get your medicines      These medications were sent to [facility] [address] [facility] [address] AT formerly [facility] & [address], [facility] [address]     Phone:  [phone]     drospirenone-ethinyl estradiol 3-0.03 MG per tablet          Primary Care Provider Office Phone # Fax #    [facility] [phone]       [facility] [address] [facility] [address]  [facility] [address]        Equal Access to Services     KELLY IBARRA : Denzel June, waaaliyahda luqzhang, qaybta kaalmaoli mo, rosalba whitman. So [facility] [phone].    ATENCIÓN: Si habla español, tiene a mcleod disposición servicios gratuitos de asistencia lingüística. Llame al [phone].    We comply with applicable federal civil rights laws and [place] laws. We do not discriminate " on the basis of race, color, national origin, age, disability, sex, sexual orientation, or gender identity.            Thank you!     Thank you for choosing [facility]  for your care. Our goal is always to provide you with excellent care. Hearing back from our patients is one way we can continue to improve our services. Please take a few minutes to complete the written survey that you may receive in the mail after your visit with us. Thank you!             Your Updated Medication List - Protect others around you: Learn how to safely use, store and throw away your medicines at www.disposemymeds.org.          This list is accurate as of 8/7/18  5:03 PM.  Always use your most recent med list.                   Brand Name Dispense Instructions for use Diagnosis    drospirenone-ethinyl estradiol 3-0.03 MG per tablet    CARMITA    84 tablet    Take 1 tablet by mouth daily    Uses oral contraception

## 2018-08-08 ASSESSMENT — ANXIETY QUESTIONNAIRES: GAD7 TOTAL SCORE: 0

## 2018-08-08 ASSESSMENT — PATIENT HEALTH QUESTIONNAIRE - PHQ9: SUM OF ALL RESPONSES TO PHQ QUESTIONS 1-9: 2

## 2018-11-06 ENCOUNTER — OFFICE VISIT (OUTPATIENT)
Dept: OBGYN | Facility: CLINIC | Age: 28
End: 2018-11-06
Payer: COMMERCIAL

## 2018-11-06 VITALS
WEIGHT: 119.6 LBS | DIASTOLIC BLOOD PRESSURE: 50 MMHG | HEIGHT: 64 IN | SYSTOLIC BLOOD PRESSURE: 98 MMHG | BODY MASS INDEX: 20.42 KG/M2

## 2018-11-06 DIAGNOSIS — N89.8 ITCHING OF VAGINA: Primary | ICD-10-CM

## 2018-11-06 LAB
SPECIMEN SOURCE: NORMAL
WET PREP SPEC: NORMAL

## 2018-11-06 PROCEDURE — 87102 FUNGUS ISOLATION CULTURE: CPT | Performed by: OBSTETRICS & GYNECOLOGY

## 2018-11-06 PROCEDURE — 99213 OFFICE O/P EST LOW 20 MIN: CPT | Performed by: OBSTETRICS & GYNECOLOGY

## 2018-11-06 PROCEDURE — 87210 SMEAR WET MOUNT SALINE/INK: CPT | Performed by: OBSTETRICS & GYNECOLOGY

## 2018-11-06 RX ORDER — CLOBETASOL PROPIONATE 0.5 MG/G
OINTMENT TOPICAL
Qty: 45 G | Refills: 0 | Status: SHIPPED | OUTPATIENT
Start: 2018-11-06 | End: 2019-01-19

## 2018-11-06 NOTE — PROGRESS NOTES
SUBJECTIVE:                                                   Lorrie Yuan is a 28 year old female who presents to clinic today for the following health issue(s):  Patient presents with:  Vaginal Problem: patient c/o vaginal irritation and discharge-dark brown, had two periods in October         HPI:  Here today for vaginal/vulvar irritation.  Had 2 menses this month --beginning of October and again last week.  Noted some vaginal irritation the week prior to last.  +burning and pain externally.  Small amount brownish discharge and then had full period.  Was unable to use tampons due to discomfort.  No discharge.  No odor.  Some itching.  Uses daily panty liner.  No other topical or self treatments.  +SA    Patient's last menstrual period was 10/27/2018 (exact date)..   Patient is sexually active, .  Using oral contraceptives for contraception.    reports that she has never smoked. She has never used smokeless tobacco.    STD testing offered?  Declined    Health maintenance updated:  yes    Today's PHQ-2 Score: No flowsheet data found.  Today's PHQ-9 Score:   PHQ-9 SCORE 2018   Total Score 2     Today's JOYCE-7 Score:   JOYCE-7 SCORE 2018   Total Score 0       Problem list and histories reviewed & adjusted, as indicated.  Additional history: as documented.    Patient Active Problem List   Diagnosis     Uses oral contraception     Acne     Past Surgical History:   Procedure Laterality Date     NO HISTORY OF SURGERY        Social History   Substance Use Topics     Smoking status: Never Smoker     Smokeless tobacco: Never Used     Alcohol use 0.0 oz/week     0 Standard drinks or equivalent per week      Problem (# of Occurrences) Relation (Name,Age of Onset)    HEART DISEASE (1) Maternal Grandfather    Ovarian Cancer (1) Maternal Grandmother            Current Outpatient Prescriptions   Medication Sig     clobetasol (TEMOVATE) 0.05 % ointment Apply sparingly to affected area twice daily as needed.  " Do not apply to face.     drospirenone-ethinyl estradiol (CARMITA) 3-0.03 MG per tablet Take 1 tablet by mouth daily     No current facility-administered medications for this visit.      No Known Allergies    ROS:  12 point review of systems negative other than symptoms noted below.  Genitourinary: Vaginal Discharge and Vaginal Itching    OBJECTIVE:     BP 98/50  Ht 5' 4\" (1.626 m)  Wt 119 lb 9.6 oz (54.3 kg)  LMP 10/27/2018 (Exact Date)  BMI 20.53 kg/m2  Body mass index is 20.53 kg/(m^2).    Exam:  Constitutional:  Appearance: Well nourished, well developed alert, in no acute distress  Skin:General Inspection:  No rashes present, no lesions present, no areas of discoloration; Genitalia and Groin:  No rashes present, no lesions present, no areas of discoloration, no masses present.  Neurologic/Psychiatric:  Mental Status:  Oriented X3   Pelvic Exam:  External Genitalia:     Normal appearance for age, no discharge present, no tenderness present, no inflammatory lesions present, color normal; SMALL AREA OF BILATERAL IRRITATION/EXCORIATION AT POSTERIOR FOURCHETTE AND ON BUTTOCKS; NO BLISTERS/SORES  Vagina:     Normal vaginal vault without central or paravaginal defects, no discharge present, no inflammatory lesions present, no masses present; MINIMAL DISCHARGE  Bladder:     Nontender to palpation  Urethra:   Urethral Body:  Urethra palpation normal, urethra structural support normal   Urethral Meatus:  No erythema or lesions present  Cervix:     DEFERRED  Uterus:     DEFERRED  Adnexa:     DEFERRED  Perineum:     Perineum within normal limits, no evidence of trauma, no rashes or skin lesions present  Anus:     Anus within normal limits, no hemorrhoids present  Inguinal Lymph Nodes:     No lymphadenopathy present  Pubic Hair:     Normal pubic hair distribution for age  Genitalia and Groin:     No rashes present, no lesions present, no areas of discoloration, no masses present               In-Clinic Test " Results:  Results for orders placed or performed in visit on 11/06/18 (from the past 24 hour(s))   Wet prep   Result Value Ref Range    Specimen Description Vagina     Wet Prep No clue cells seen     Wet Prep No Trichomonas seen     Wet Prep No yeast seen    Yeast culture   Result Value Ref Range    Specimen Description Vagina     Special Requests Specimen collected in eSwab transport (white cap)     Culture Micro PENDING        ASSESSMENT/PLAN:                                                        ICD-10-CM    1. Itching of vagina N89.8 Wet prep     Yeast culture     clobetasol (TEMOVATE) 0.05 % ointment       Patient Instructions   Exam findings reviewed with Lorrie cr; no evidence of infection.   Area of soreness appears to be a contact dermatitis.  Discussed avoiding irritant (likely panty liner) and may use topical clobetasol for symptomatic relief.          Agustina Tran MD  [facility]

## 2018-11-06 NOTE — PATIENT INSTRUCTIONS
Exam findings reviewed with Lorrie today; no evidence of infection.   Area of soreness appears to be a contact dermatitis.  Discussed avoiding irritant (likely panty liner) and may use topical clobetasol for symptomatic relief.

## 2018-11-06 NOTE — MR AVS SNAPSHOT
"              After Visit Summary   11/6/2018    Lorrie Yuan    MRN: [id number]           Patient Information     Date Of Birth          1990        Visit Information        Provider Department      11/6/2018 8:50 AM Agustina Tran MD [facility]        Today's Diagnoses     Itching of vagina    -  1      Care Instructions    Exam findings reviewed with Lorrie today; no evidence of infection.   Area of soreness appears to be a contact dermatitis.  Discussed avoiding irritant (likely panty liner) and may use topical clobetasol for symptomatic relief.              Follow-ups after your visit        Follow-up notes from your care team     Return if symptoms worsen or fail to improve.      Who to contact     If you have questions or need follow up information about today's clinic visit or your schedule please contact [facility] directly at [phone].  Normal or non-critical lab and imaging results will be communicated to you by MyChart, letter or phone within 4 business days after the clinic has received the results. If you do not hear from us within 7 days, please contact the clinic through MyChart or phone. If you have a critical or abnormal lab result, we will notify you by phone as soon as possible.  Submit refill requests through UserApp or call your pharmacy and they will forward the refill request to us. Please allow 3 business days for your refill to be completed.          Additional Information About Your Visit        MyChart Information     UserApp lets you send messages to your doctor, view your test results, renew your prescriptions, schedule appointments and more. To sign up, go to [email or web address] . Click on \"Log in\" on the left side of the screen, which will take you to the Welcome page. Then click on \"Sign up Now\" on the right side of the page.     You will be asked to enter the access code listed below, as well as some personal " "information. Please follow the directions to create your username and password.     Your access code is: [email or web address]  Expires: 2019  8:50 AM     Your access code will  in 90 days. If you need help or a new code, please call your [place] clinic or [phone].        Care EveryWhere ID     This is your Care EveryWhere ID. This could be used by other organizations to access your [place] medical records  [id number]        Your Vitals Were     Height Last Period BMI (Body Mass Index)             5' 4\" (1.626 m) 10/27/2018 (Exact Date) 20.53 kg/m2          Blood Pressure from Last 3 Encounters:   18 98/50   18 98/64   18 106/66    Weight from Last 3 Encounters:   18 119 lb 9.6 oz (54.3 kg)   18 120 lb (54.4 kg)   18 129 lb (58.5 kg)              We Performed the Following     Wet prep     Yeast culture          Today's Medication Changes          These changes are accurate as of 18  3:46 PM.  If you have any questions, ask your nurse or doctor.               Start taking these medicines.        Dose/Directions    clobetasol 0.05 % ointment   Commonly known as:  TEMOVATE   Used for:  Itching of vagina   Started by:  Agustina Tran MD        Apply sparingly to affected area twice daily as needed.  Do not apply to face.   Quantity:  45 g   Refills:  0            Where to get your medicines      These medications were sent to [facility] [address] [facility] [address] AT [facility] & [address], [facility] [address]     Phone:  [phone]     clobetasol 0.05 % ointment                Primary Care Provider Office Phone # Fax #    [facility] [phone]       [facility] [address] [facility] [address]  [facility] [address]        Equal Access to Services     KELLY IBARRA AH: Denzel June, waaxda donald cohen waxay idiin " debi batistaalvaro munalroaine lasamiclarisa ah. So [facility] [phone].    ATENCIÓN: Si josephinela efe, tiene a mcleod disposición servicios gratuitos de asistencia lingüística. Benoit al [phone].    We comply with applicable federal civil rights laws and [place] laws. We do not discriminate on the basis of race, color, national origin, age, disability, sex, sexual orientation, or gender identity.            Thank you!     Thank you for choosing [facility] [place]  for your care. Our goal is always to provide you with excellent care. Hearing back from our patients is one way we can continue to improve our services. Please take a few minutes to complete the written survey that you may receive in the mail after your visit with us. Thank you!             Your Updated Medication List - Protect others around you: Learn how to safely use, store and throw away your medicines at www.disposemymeds.org.          This list is accurate as of 11/6/18  3:46 PM.  Always use your most recent med list.                   Brand Name Dispense Instructions for use Diagnosis    clobetasol 0.05 % ointment    TEMOVATE    45 g    Apply sparingly to affected area twice daily as needed.  Do not apply to face.    Itching of vagina       drospirenone-ethinyl estradiol 3-0.03 MG per tablet    CARMITA    84 tablet    Take 1 tablet by mouth daily    Uses oral contraception

## 2018-11-12 LAB
Lab: NORMAL
SPECIMEN SOURCE: NORMAL
YEAST SPEC QL CULT: NORMAL

## 2019-01-19 DIAGNOSIS — N89.8 ITCHING OF VAGINA: ICD-10-CM

## 2019-01-21 RX ORDER — CLOBETASOL PROPIONATE 0.5 MG/G
OINTMENT TOPICAL
Qty: 45 G | Refills: 0 | Status: SHIPPED | OUTPATIENT
Start: 2019-01-21 | End: 2019-04-29

## 2019-01-21 NOTE — TELEPHONE ENCOUNTER
Requested Prescriptions   Pending Prescriptions Disp Refills     clobetasol (TEMOVATE) 0.05 % external ointment [Pharmacy Med Name: CLOBETASOL PROP 0.05% OINTMENT 45GM] 45 g 0     Sig: APPLY SPARINGLY EXTERNALLY TO THE AFFECTED AREA TWICE DAILY AS NEEDED. DO NOT APPLY TO FACE    There is no refill protocol information for this order        Last Written Prescription Date:  11/6/18  Last Fill Quantity: 45g,  # refills: 0   Last office visit: 11/6/2018 with prescribing provider:  Dr. Tran   Future Office Visit:  None    Prescription approved per [facility] Refill Protocol.  Marleny Villegas RN on 1/21/2019 at 9:58 AM

## 2019-04-29 DIAGNOSIS — N89.8 ITCHING OF VAGINA: ICD-10-CM

## 2019-04-30 RX ORDER — CLOBETASOL PROPIONATE 0.5 MG/G
OINTMENT TOPICAL
Qty: 45 G | Refills: 0 | Status: SHIPPED | OUTPATIENT
Start: 2019-04-30 | End: 2019-09-20

## 2019-04-30 NOTE — TELEPHONE ENCOUNTER
Requested Prescriptions   Pending Prescriptions Disp Refills     clobetasol (TEMOVATE) 0.05 % external ointment [Pharmacy Med Name: CLOBETASOL PROP 0.05% OINTMENT 45GM] 45 g 0     Sig: APPLY SPARINGLY EXTERNALLY TO THE AFFECTED AREA TWICE DAILY AS NEEDED. DO NOT APPLY TO FACE       There is no refill protocol information for this order        Last Written Prescription Date:  1/21/19  Last Fill Quantity: 45g,  # refills: 0   Last office visit: 11/6/2018 with prescribing provider:  Marc   Future Office Visit:    Annual due: 8/7/19    Prescription approved per [facility] Refill Protocol.

## 2019-08-11 DIAGNOSIS — Z30.41 USES ORAL CONTRACEPTION: ICD-10-CM

## 2019-08-12 RX ORDER — DROSPIRENONE AND ETHINYL ESTRADIOL 0.03MG-3MG
KIT ORAL
Qty: 84 TABLET | Refills: 0 | Status: SHIPPED | OUTPATIENT
Start: 2019-08-12 | End: 2019-09-20

## 2019-08-12 NOTE — TELEPHONE ENCOUNTER
"Requested Prescriptions   Pending Prescriptions Disp Refills     ELADIA 3-0.03 MG tablet [Pharmacy Med Name: ELADIA 3-0.03MG TABLETS 28S] 84 tablet 0     Sig: TAKE 1 TABLET BY MOUTH DAILY       Contraceptives Protocol Passed - 8/11/2019  9:37 PM        Passed - Patient is not a current smoker if age is 35 or older        Passed - Recent (12 mo) or future (30 days) visit within the authorizing provider's specialty     Patient had office visit in the last 12 months or has a visit in the next 30 days with authorizing provider or within the authorizing provider's specialty.  See \"Patient Info\" tab in inbasket, or \"Choose Columns\" in Meds & Orders section of the refill encounter.              Passed - Medication is active on med list        Passed - No active pregnancy on record        Passed - No positive pregnancy test in past 12 months      Last Written Prescription Date:  8/7/18  Last Fill Quantity: 84,  # refills: 4   Last office visit: 11/6/2018 with prescribing provider:  Marc   Future Office Visit:   Next 5 appointments (look out 90 days)    Aug 20, 2019  8:40 AM CDT  PHYSICAL with Agustina Tran MD  [facility] ([facility] [place]) [address] [place] [address] [phone]       Prescription [place] per [facility] [place].  "

## 2019-09-20 ENCOUNTER — RESULT FOLLOW UP (OUTPATIENT)
Dept: OBGYN | Facility: CLINIC | Age: 29
End: 2019-09-20

## 2019-09-20 ENCOUNTER — OFFICE VISIT (OUTPATIENT)
Dept: OBGYN | Facility: CLINIC | Age: 29
End: 2019-09-20
Payer: COMMERCIAL

## 2019-09-20 VITALS
WEIGHT: 117 LBS | HEIGHT: 64 IN | SYSTOLIC BLOOD PRESSURE: 114 MMHG | BODY MASS INDEX: 19.97 KG/M2 | DIASTOLIC BLOOD PRESSURE: 62 MMHG

## 2019-09-20 DIAGNOSIS — Z30.41 USES ORAL CONTRACEPTION: ICD-10-CM

## 2019-09-20 DIAGNOSIS — Z01.419 ENCOUNTER FOR GYNECOLOGICAL EXAMINATION WITHOUT ABNORMAL FINDING: Primary | ICD-10-CM

## 2019-09-20 DIAGNOSIS — N89.8 ITCHING OF VAGINA: ICD-10-CM

## 2019-09-20 DIAGNOSIS — N90.4 LICHEN SCLEROSUS OF FEMALE GENITALIA: ICD-10-CM

## 2019-09-20 PROCEDURE — G0123 SCREEN CERV/VAG THIN LAYER: HCPCS | Performed by: OBSTETRICS & GYNECOLOGY

## 2019-09-20 PROCEDURE — G0124 SCREEN C/V THIN LAYER BY MD: HCPCS | Performed by: OBSTETRICS & GYNECOLOGY

## 2019-09-20 PROCEDURE — 99395 PREV VISIT EST AGE 18-39: CPT | Performed by: OBSTETRICS & GYNECOLOGY

## 2019-09-20 RX ORDER — DROSPIRENONE AND ETHINYL ESTRADIOL 0.03MG-3MG
1 KIT ORAL DAILY
Qty: 84 TABLET | Refills: 5 | Status: SHIPPED | OUTPATIENT
Start: 2019-09-20 | End: 2020-06-30

## 2019-09-20 RX ORDER — CLOBETASOL PROPIONATE 0.5 MG/G
OINTMENT TOPICAL
Qty: 45 G | Refills: 1 | Status: SHIPPED | OUTPATIENT
Start: 2019-09-20 | End: 2020-02-04

## 2019-09-20 ASSESSMENT — ANXIETY QUESTIONNAIRES
5. BEING SO RESTLESS THAT IT IS HARD TO SIT STILL: NOT AT ALL
GAD7 TOTAL SCORE: 2
2. NOT BEING ABLE TO STOP OR CONTROL WORRYING: NOT AT ALL
7. FEELING AFRAID AS IF SOMETHING AWFUL MIGHT HAPPEN: NOT AT ALL
IF YOU CHECKED OFF ANY PROBLEMS ON THIS QUESTIONNAIRE, HOW DIFFICULT HAVE THESE PROBLEMS MADE IT FOR YOU TO DO YOUR WORK, TAKE CARE OF THINGS AT HOME, OR GET ALONG WITH OTHER PEOPLE: NOT DIFFICULT AT ALL
6. BECOMING EASILY ANNOYED OR IRRITABLE: NOT AT ALL
3. WORRYING TOO MUCH ABOUT DIFFERENT THINGS: NOT AT ALL
1. FEELING NERVOUS, ANXIOUS, OR ON EDGE: SEVERAL DAYS

## 2019-09-20 ASSESSMENT — PATIENT HEALTH QUESTIONNAIRE - PHQ9
5. POOR APPETITE OR OVEREATING: SEVERAL DAYS
SUM OF ALL RESPONSES TO PHQ QUESTIONS 1-9: 3

## 2019-09-20 ASSESSMENT — MIFFLIN-ST. JEOR: SCORE: 1250.71

## 2019-09-20 NOTE — PROGRESS NOTES
Lorrie is a 29 year old  female who presents for annual exam.     Besides routine health maintenance,  she would like to discuss vaginal irritation.    HPI:  Here today for yearly exam --doing well.  Regular, monthly menses x 4-5d.  Light bleeding and minimal cramping.  No intermenstrual bleeding or spotting.  Has continued to have irritation near the vaginal opening just before her menses start on her 2nd-3rd day of placebo pills.  Notes a signficant increase in discharge those days and inevitably has more discomfort and irritation.  Avoiding pads/liners as recommended by me last fall but has even had to change undergarments because of dampness.  Will try continuous OCPs to see if this helps with irritation.  Used the clobetasol with nearly immediate relief.   Has since run out and not used since July.  +irritation today.  +SA --no issues or concerns except for when that area is inflamed.  Denies bowel/bladder issues.    Dating --boyfriend x 1.5yrs; has worked for [employer] for the past 3yrs --starts new job with [employer] on Monday!!  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  Living alone  -staying active  -no SBE  No PCP  -declines flu shot today --will get through [facility]      GYNECOLOGIC HISTORY:    Patient's last menstrual period was 2019.  Her current contraception method is: oral contraceptives.  She  reports that she has never smoked. She has never used smokeless tobacco.    Patient is sexually active.  STD testing offered?  Declined  Last PHQ-9 score on record =   PHQ-9 SCORE 2019   PHQ-9 Total Score 3     Last GAD7 score on record =   JOYCE-7 SCORE 2019   Total Score 2     Alcohol Score = 1    HEALTH MAINTENANCE:  Cholesterol: (No results found for: CHOL   Last Mammo: NA, Result: not applicable, Next Mammo: NA   Pap:   Lab Results   Component Value Date    PAP NIL 2016     "3/31/16 WNL   Colonoscopy:  NA, Result: not applicable, Next Colonoscopy: NA years.  Dexa:  NA    Health maintenance updated:  yes    HISTORY:  OB History    Para Term  AB Living   1 0 0 0 1 0   SAB TAB Ectopic Multiple Live Births   0 0 0 0 0      # Outcome Date GA Lbr Kenny/2nd Weight Sex Delivery Anes PTL Lv   1 AB                Patient Active Problem List   Diagnosis     Uses oral contraception     Acne     Lichen sclerosus of female genitalia     Past Surgical History:   Procedure Laterality Date     NO HISTORY OF SURGERY        Social History     Tobacco Use     Smoking status: Never Smoker     Smokeless tobacco: Never Used   Substance Use Topics     Alcohol use: Yes     Alcohol/week: 0.0 oz      Problem (# of Occurrences) Relation (Name,Age of Onset)    Heart Disease (1) Maternal Grandfather    No Known Problems (6) Mother, Father, Sister, Brother, Paternal Grandmother, Other    Ovarian Cancer (1) Maternal Grandmother            Current Outpatient Medications   Medication Sig     clobetasol (TEMOVATE) 0.05 % external ointment Apply small amount to affected areas 1-2 times per day as needed     drospirenone-ethinyl estradiol (ELADIA) 3-0.03 MG tablet Take 1 tablet by mouth daily     No current facility-administered medications for this visit.      No Known Allergies    Past medical, surgical, social and family histories were reviewed and updated in EPIC.    ROS:   12 point review of systems negative other than symptoms noted below.  Genitourinary: vaginal irritation    EXAM:  /62   Ht 1.626 m (5' 4\")   Wt 53.1 kg (117 lb)   LMP 2019   Breastfeeding? No   BMI 20.08 kg/m     BMI: Body mass index is 20.08 kg/m .    PHYSICAL EXAM:  Constitutional:  Appearance: Well nourished, well developed, alert, in no acute distress  Neck:  Lymph Nodes:  No lymphadenopathy present    Thyroid:  Gland size normal, nontender, no nodules or masses present  on palpation  Chest:  Respiratory Effort:  " Breathing unlabored  Cardiovascular:    Heart: Auscultation:  Regular rate, normal rhythm, no murmurs present  Breasts: Inspection of Breasts:  No lymphadenopathy present., Palpation of Breasts and Axillae:  No masses present on palpation, no breast tenderness., Axillary Lymph Nodes:  No lymphadenopathy present. and No nodularity, asymmetry or nipple discharge bilaterally.  Gastrointestinal:   Abdominal Examination:  Abdomen nontender to palpation, tone normal without rigidity or guarding, no masses present, umbilicus without lesions   Liver and Spleen:  No hepatomegaly present, liver nontender to palpation    Hernias:  No hernias present  Lymphatic: Lymph Nodes:  No other lymphadenopathy present  Skin:  General Inspection:  No rashes present, no lesions present, no areas of  discoloration    Genitalia and Groin:  No rashes present, no lesions present, no areas of  discoloration, no masses present  Neurologic/Psychiatric:    Mental Status:  Oriented X3     Pelvic Exam:  External Genitalia:     Normal appearance for age, no discharge present, no tenderness present, no inflammatory lesions present, color normal; +HYPOPIGMENTATION/PARCHMENT PAPER LIKE APPEARANCE AT POSTERIOR [place]E/VESTIBULE CONSISTENT WITH LICHEN SCLEROSUS; SOME SKIN BREAKDOWN NOTED  Vagina:     Normal vaginal vault without central or paravaginal defects, no discharge present, no inflammatory lesions present, no masses present  Bladder:     Nontender to palpation  Urethra:   Urethral Body:  Urethra palpation normal, urethra structural support normal   Urethral Meatus:  No erythema or lesions present  Cervix:     Appearance healthy, no lesions present, nontender to palpation, no bleeding present  Uterus:     Uterus: firm, normal sized and nontender, anteverted in position.   Adnexa:     No adnexal tenderness present, no adnexal masses present  Perineum:     Perineum within normal limits, no evidence of trauma, no rashes or skin lesions  present  Anus:     Anus within normal limits, no hemorrhoids present  Inguinal Lymph Nodes:     No lymphadenopathy present  Pubic Hair:     Normal pubic hair distribution for age  Genitalia and Groin:     No rashes present, no lesions present, no areas of discoloration, no masses present      COUNSELING:   Reviewed preventive health counseling, as reflected in patient instructions  Special attention given to:        Regular exercise       Healthy diet/nutrition       Contraception    BMI: Body mass index is 20.08 kg/m .      ASSESSMENT:  29 year old female with satisfactory annual exam.    ICD-10-CM    1. Encounter for gynecological examination without abnormal finding Z01.419 Pap imaged thin layer screen reflex to HPV if ASCUS - recommended age 25 - 29 years   2. Itching of vagina N89.8 clobetasol (TEMOVATE) 0.05 % external ointment   3. Uses oral contraception Z30.41 drospirenone-ethinyl estradiol (ELADIA) 3-0.03 MG tablet   4. Lichen sclerosus of female genitalia N90.4        PLAN:  Patient Instructions   Follow up with your primary care provider for your other medical problems.  Continue self breast exam.  Increase physical activity and exercise.  Lab and pap smear results will be called to the patient.  Reflex pap smear done today and will repeat in 3yrs if negative.  Usual safety and preventative measures counseling done.  Will start using her birth control pill continuously in hopes of eliminating vulvar irritation/itching/flares during her placebo week.  Declines flu shot today --will get through the [facility].      Agustina Tran MD

## 2019-09-20 NOTE — PATIENT INSTRUCTIONS
Follow up with your primary care provider for your other medical problems.  Continue self breast exam.  Increase physical activity and exercise.  Lab and pap smear results will be called to the patient.  Reflex pap smear done today and will repeat in 3yrs if negative.  Usual safety and preventative measures counseling done.  Will start using her birth control pill continuously in hopes of eliminating vulvar irritation/itching/flares during her placebo week.  Declines flu shot today --will get through the [facility].

## 2019-09-21 ASSESSMENT — ANXIETY QUESTIONNAIRES: GAD7 TOTAL SCORE: 2

## 2019-09-26 LAB
COPATH REPORT: ABNORMAL
PAP: ABNORMAL

## 2019-09-26 NOTE — PROGRESS NOTES
9/20/19 LSIL pap.  Plan: colpo  9/25/19 left msg  9/26/19 Advised of result and follow up..   12/9/19 Colpo: Bx/ECC-neg. Plan: cotest in 1 year (nathen)

## 2019-11-16 DIAGNOSIS — N89.8 ITCHING OF VAGINA: ICD-10-CM

## 2019-11-18 RX ORDER — CLOBETASOL PROPIONATE 0.5 MG/G
OINTMENT TOPICAL
Qty: 45 G | Refills: 0 | OUTPATIENT
Start: 2019-11-18

## 2019-11-18 NOTE — TELEPHONE ENCOUNTER
"Attempted to call to ask patient is she needing refill.     Per note 9/20/19  Has continued to have irritation near the vaginal opening just before her menses start on her 2nd-3rd day of placebo pills.  Notes a signficant increase in discharge those days and inevitably has more discomfort and irritation.  Avoiding pads/liners as recommended by me last fall but has even had to change undergarments because of dampness.  Will try continuous OCPs to see if this helps with irritation.  Used the clobetasol with nearly immediate relief.   Wanted to check on symptoms if requesting refills so soon. Mail box full.      Refill request refused due to :   [x] Refills available at pharmacy.  Request sent back to pharmacy as \"duplicate\"  [] Patient report no longer needing it  [] Medication discontinued     Marcy Quintana RN on 11/18/2019 at 12:16 PM    "

## 2019-11-18 NOTE — TELEPHONE ENCOUNTER
Requested Prescriptions   Pending Prescriptions Disp Refills     clobetasol (TEMOVATE) 0.05 % external ointment [Pharmacy Med Name: CLOBETASOL PROP 0.05% OINTMENT 45GM] 45 g 0     Sig: APPLY SPARINGLY EXTERNALLY TO THE AFFECTED AREA TWICE DAILY AS NEEDED. DO NOT APPLY TO FACE       There is no refill protocol information for this order        Last Written Prescription Date:  9/20/19  Last Fill Quantity: 45g,  # refills: 1   Last office visit: 9/20/2019 with prescribing provider:  Marc   Future Office Visit:

## 2019-12-04 NOTE — PROGRESS NOTES
INDICATIONS:                                                    Is a pregnancy test required: No.  Was a consent obtained?  Yes    Today's PHQ-2 Score: No flowsheet data found.  Today's PHQ-9 Score:    PHQ-9 SCORE 9/20/2019   PHQ-9 Total Score 3       Lorrie Yuan, is a 29 year old female, who had a recent LGSIL pap.  HPV Negative No prior history of abnormal pap. Here today for colposcopy. Discussed indication, risks of infection and bleeding.    Her last pap was   Lab Results   Component Value Date    PAP LSIL 09/20/2019    .    PROCEDURE:                                                      Cervix is stained with acetic acid and viewed colposcopically. Squamocolumnar junction is visualized in it's entirety. acetowhite lesion(s) noted at 12 o'clock in the transformation zone and at 6 0'clock in the ectocervix . Biopsy done Yes. Endocervical curretage Done         POST PROCEDURE:                                                      IMPRESSION: Patient tolerated procedure well, colposcopy adequate, HPV and JM 1    PLAN : Await the results of the biopsies.  She tolerated the procedure well. There were no complications. Patient was discharged in stable condition.    Patient advised to call the clinic if excessive bleeding, pelvic pain, or fever.     Follow-up based on pathology results.  Noemy Nolan MD

## 2019-12-09 ENCOUNTER — OFFICE VISIT (OUTPATIENT)
Dept: OBGYN | Facility: CLINIC | Age: 29
End: 2019-12-09
Payer: COMMERCIAL

## 2019-12-09 VITALS — WEIGHT: 119 LBS | BODY MASS INDEX: 20.43 KG/M2 | DIASTOLIC BLOOD PRESSURE: 64 MMHG | SYSTOLIC BLOOD PRESSURE: 102 MMHG

## 2019-12-09 DIAGNOSIS — R87.612 PAPANICOLAOU SMEAR OF CERVIX WITH LOW GRADE SQUAMOUS INTRAEPITHELIAL LESION (LGSIL): Primary | ICD-10-CM

## 2019-12-09 PROCEDURE — 57454 BX/CURETT OF CERVIX W/SCOPE: CPT | Performed by: OBSTETRICS & GYNECOLOGY

## 2019-12-09 PROCEDURE — 88342 IMHCHEM/IMCYTCHM 1ST ANTB: CPT | Performed by: OBSTETRICS & GYNECOLOGY

## 2019-12-09 PROCEDURE — 88305 TISSUE EXAM BY PATHOLOGIST: CPT | Performed by: OBSTETRICS & GYNECOLOGY

## 2019-12-12 LAB — COPATH REPORT: NORMAL

## 2020-02-04 DIAGNOSIS — N89.8 ITCHING OF VAGINA: ICD-10-CM

## 2020-02-04 RX ORDER — CLOBETASOL PROPIONATE 0.5 MG/G
OINTMENT TOPICAL
Qty: 45 G | Refills: 1 | Status: SHIPPED | OUTPATIENT
Start: 2020-02-04 | End: 2021-09-09

## 2020-02-04 NOTE — TELEPHONE ENCOUNTER
Requested Prescriptions   Pending Prescriptions Disp Refills     clobetasol (TEMOVATE) 0.05 % external ointment [Pharmacy Med Name: CLOBETASOL PROP 0.05% OINTMENT 45GM] 45 g 1     Sig: APPLY SMALL AMOUNT EXTERNALLY TO THE AFFECTED AREA 1 TO 2 TIMES PER DAY AS NEEDED       There is no refill protocol information for this order        Rx refilled  Marleny Villegas RN on 2/4/2020 at 2:56 PM

## 2020-06-29 DIAGNOSIS — Z30.41 USES ORAL CONTRACEPTION: ICD-10-CM

## 2020-06-30 RX ORDER — DROSPIRENONE AND ETHINYL ESTRADIOL 0.03MG-3MG
KIT ORAL
Qty: 84 TABLET | Refills: 0 | Status: SHIPPED | OUTPATIENT
Start: 2020-06-30 | End: 2020-10-09

## 2020-06-30 NOTE — TELEPHONE ENCOUNTER
"Requested Prescriptions   Pending Prescriptions Disp Refills     ELADIA 3-0.03 MG tablet [Pharmacy Med Name: ELADIA 3-0.03MG TABLETS 28S] 84 tablet 5     Sig: TAKE 1 TABLET BY MOUTH DAILY       Contraceptives Protocol Passed - 6/29/2020  3:23 PM        Passed - Patient is not a current smoker if age is 35 or older        Passed - Recent (12 mo) or future (30 days) visit within the authorizing provider's specialty     Patient has had an office visit with the authorizing provider or a provider within the authorizing providers department within the previous 12 mos or has a future within next 30 days. See \"Patient Info\" tab in inbasket, or \"Choose Columns\" in Meds & Orders section of the refill encounter.              Passed - Medication is active on med list        Passed - No active pregnancy on record        Passed - No positive pregnancy test in past 12 months           Last Written Prescription Date:  9/20/19  Last Fill Quantity: 84,  # refills: 5   Last office visit: 12/9/2019 with prescribing provider:  Dr Nolan   Future Office Visit:  None    Refill sent  Marleny Villegas RN on 6/30/2020 at 11:48 AM    "

## 2020-10-06 NOTE — PROGRESS NOTES
Lorrie is a 30 year old  female who presents for annual exam.     Besides routine health maintenance, she has no other health concerns today .    HPI:  Here today for yearly exam --doing well.  Regular, monthly menses x 5d with current OCP.   No intermenstrual bleeding/spotting.  Light menses and minimal cramping.  Has noticed more itching with her lichen sclerosus and uses clobetasol a few times during that week with good results/relief.  +SA --no issues.  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 in [employer] sciences this summer  -staying active  +SBE on occ --questions about when mammo starts; no fam hx  -will get flu shot at [employer] next week  -had LGSIL pap smear last year; colpo bx neg x 2 with Dr. Nolan; reflex pap smear repeated today; has NOT had gardasil --will check with insurance      GYNECOLOGIC HISTORY:    Patient's last menstrual period was 2020 (exact date).    Regular menses? yes  Menses every 28 days.  Length of menses: 5 days    Her current contraception method is: oral contraceptives.  She  reports that she has never smoked. She has never used smokeless tobacco.    Patient is sexually active.  STD testing offered?  Declined  Last PHQ-9 score on record =   PHQ-9 SCORE 10/9/2020   PHQ-9 Total Score 1     Last GAD7 score on record =   JOYCE-7 SCORE 10/9/2020   Total Score 3     Alcohol Score = 2    HEALTH MAINTENANCE:  Cholesterol: (No results found for: CHOL   Last Mammo: Not applicable, Result: Not applicable, Next Mammo: Due at age 42   Pap:   Lab Results   Component Value Date    PAP LSIL 2019    PAP NIL 2016 LSIL  Colonoscopy:  NA, Result: Not applicable, Next Colonoscopy: NA years.  Dexa:  NA    Health maintenance updated:  yes    HISTORY:  OB History    Para Term  AB Living   1 0 0 0 1 0   SAB TAB Ectopic Multiple Live Births   0 0 0 0  "0      # Outcome Date GA Lbr Kenny/2nd Weight Sex Delivery Anes PTL Lv   1 AB                Patient Active Problem List   Diagnosis     Uses oral contraception     Acne     Lichen sclerosus of female genitalia     LGSIL of cervix of undetermined significance     Past Surgical History:   Procedure Laterality Date     COLPOSCOPY       NO HISTORY OF SURGERY        Social History     Tobacco Use     Smoking status: Never Smoker     Smokeless tobacco: Never Used   Substance Use Topics     Alcohol use: Yes     Alcohol/week: 0.0 standard drinks      Problem (# of Occurrences) Relation (Name,Age of Onset)    Heart Disease (1) Maternal Grandfather    No Known Problems (6) Mother, Father, Sister, Brother, Paternal Grandmother, Other    Ovarian Cancer (1) Maternal Grandmother            Current Outpatient Medications   Medication Sig     clobetasol (TEMOVATE) 0.05 % external ointment APPLY SMALL AMOUNT EXTERNALLY TO THE AFFECTED AREA 1 TO 2 TIMES PER DAY AS NEEDED     drospirenone-ethinyl estradiol (ELADIA) 3-0.03 MG tablet Take 1 tablet by mouth daily     No current facility-administered medications for this visit.      No Known Allergies    Past medical, surgical, social and family histories were reviewed and updated in EPIC.    ROS:   12 point review of systems negative other than symptoms noted below or in the HPI.  No urinary frequency or dysuria, bladder or kidney problems, Normal menstrual cycles    EXAM:  /56   Ht 1.651 m (5' 5\")   Wt 49.9 kg (110 lb)   LMP 09/27/2020 (Exact Date)   Breastfeeding No   BMI 18.30 kg/m     BMI: Body mass index is 18.3 kg/m .    PHYSICAL EXAM:  Constitutional:   Appearance: Well nourished, well developed, alert, in no acute distress  Neck:  Lymph Nodes:  No lymphadenopathy present    Thyroid:  Gland size normal, nontender, no nodules or masses present  on palpation  Chest:  Respiratory Effort:  Breathing unlabored  Cardiovascular:    Heart: Auscultation:  Regular rate, normal " rhythm, no murmurs present  Breasts: Inspection of Breasts:  No lymphadenopathy present., Palpation of Breasts and Axillae:  No masses present on palpation, no breast tenderness., Axillary Lymph Nodes:  No lymphadenopathy present. and No nodularity, asymmetry or nipple discharge bilaterally.  Gastrointestinal:   Abdominal Examination:  Abdomen nontender to palpation, tone normal without rigidity or guarding, no masses present, umbilicus without lesions   Liver and Spleen:  No hepatomegaly present, liver nontender to palpation    Hernias:  No hernias present  Lymphatic: Lymph Nodes:  No other lymphadenopathy present  Skin:  General Inspection:  No rashes present, no lesions present, no areas of  discoloration  Neurologic:    Mental Status:  Oriented X3.  Normal strength and tone, sensory exam                grossly normal, mentation intact and speech normal.    Psychiatric:   Mentation appears normal and affect normal/bright.         Pelvic Exam:  External Genitalia:     Normal appearance for age, no discharge present, no tenderness present, no inflammatory lesions present, color normal; SMALL AREA OF PERICLITORAL HYPOPIGMENTATION NOTED ON CLITORAL FRYE  Vagina:     Normal vaginal vault without central or paravaginal defects, no discharge present, no inflammatory lesions present, no masses present  Bladder:     Nontender to palpation  Urethra:   Urethral Body:  Urethra palpation normal, urethra structural support normal   Urethral Meatus:  No erythema or lesions present  Cervix:     Appearance healthy, no lesions present, nontender to palpation, no bleeding present  Uterus:     Uterus: firm, normal sized and nontender, midplane in position.   Adnexa:     No adnexal tenderness present, no adnexal masses present  Perineum:     Perineum within normal limits, no evidence of trauma, no rashes or skin lesions present  Anus:     Anus within normal limits, no hemorrhoids present  Inguinal Lymph Nodes:     No lymphadenopathy  present  Pubic Hair:     Normal pubic hair distribution for age  Genitalia and Groin:     No rashes present, no lesions present, no areas of discoloration, no masses present      COUNSELING:   Reviewed preventive health counseling, as reflected in patient instructions  Special attention given to:        Regular exercise       Healthy diet/nutrition       Contraception       Safe sex practices/STD prevention    BMI: Body mass index is 18.3 kg/m .      ASSESSMENT:  30 year old female with satisfactory annual exam.    ICD-10-CM    1. Encounter for gynecological examination without abnormal finding  Z01.419 Pap imaged thin layer screen reflex to HPV if ASCUS - recommended age 25 - 29 years   2. Uses oral contraception  Z30.41 drospirenone-ethinyl estradiol (ELADIA) 3-0.03 MG tablet       PLAN:  Patient Instructions   Follow up with your primary care provider for your other medical problems.  Continue self breast exam.  Increase physical activity and exercise.  Lab and pap smear results will be called to the patient.  Reflex pap smear done today and will repeat in 3yrs if normal.  If abnormal, will need to repeat colposcopy.  Usual safety and preventative measures counseling done.  Will contact insurance regarding gardasil coverage.  Information brochure given.      Agustina Tran MD

## 2020-10-09 ENCOUNTER — OFFICE VISIT (OUTPATIENT)
Dept: OBGYN | Facility: CLINIC | Age: 30
End: 2020-10-09
Payer: COMMERCIAL

## 2020-10-09 VITALS
WEIGHT: 110 LBS | DIASTOLIC BLOOD PRESSURE: 56 MMHG | BODY MASS INDEX: 18.33 KG/M2 | HEIGHT: 65 IN | SYSTOLIC BLOOD PRESSURE: 110 MMHG

## 2020-10-09 DIAGNOSIS — Z30.41 USES ORAL CONTRACEPTION: ICD-10-CM

## 2020-10-09 DIAGNOSIS — Z01.419 ENCOUNTER FOR GYNECOLOGICAL EXAMINATION WITHOUT ABNORMAL FINDING: Primary | ICD-10-CM

## 2020-10-09 PROCEDURE — 88175 CYTOPATH C/V AUTO FLUID REDO: CPT | Performed by: OBSTETRICS & GYNECOLOGY

## 2020-10-09 PROCEDURE — 87624 HPV HI-RISK TYP POOLED RSLT: CPT | Performed by: OBSTETRICS & GYNECOLOGY

## 2020-10-09 PROCEDURE — 99395 PREV VISIT EST AGE 18-39: CPT | Performed by: OBSTETRICS & GYNECOLOGY

## 2020-10-09 RX ORDER — DROSPIRENONE AND ETHINYL ESTRADIOL 0.03MG-3MG
1 KIT ORAL DAILY
Qty: 84 TABLET | Refills: 4 | Status: SHIPPED | OUTPATIENT
Start: 2020-10-09 | End: 2021-09-09

## 2020-10-09 ASSESSMENT — PATIENT HEALTH QUESTIONNAIRE - PHQ9
SUM OF ALL RESPONSES TO PHQ QUESTIONS 1-9: 1
5. POOR APPETITE OR OVEREATING: NOT AT ALL

## 2020-10-09 ASSESSMENT — ANXIETY QUESTIONNAIRES
6. BECOMING EASILY ANNOYED OR IRRITABLE: SEVERAL DAYS
1. FEELING NERVOUS, ANXIOUS, OR ON EDGE: NOT AT ALL
IF YOU CHECKED OFF ANY PROBLEMS ON THIS QUESTIONNAIRE, HOW DIFFICULT HAVE THESE PROBLEMS MADE IT FOR YOU TO DO YOUR WORK, TAKE CARE OF THINGS AT HOME, OR GET ALONG WITH OTHER PEOPLE: SOMEWHAT DIFFICULT
3. WORRYING TOO MUCH ABOUT DIFFERENT THINGS: SEVERAL DAYS
7. FEELING AFRAID AS IF SOMETHING AWFUL MIGHT HAPPEN: NOT AT ALL
2. NOT BEING ABLE TO STOP OR CONTROL WORRYING: SEVERAL DAYS
5. BEING SO RESTLESS THAT IT IS HARD TO SIT STILL: NOT AT ALL
GAD7 TOTAL SCORE: 3

## 2020-10-09 ASSESSMENT — MIFFLIN-ST. JEOR: SCORE: 1229.84

## 2020-10-09 NOTE — PATIENT INSTRUCTIONS
Follow up with your primary care provider for your other medical problems.  Continue self breast exam.  Increase physical activity and exercise.  Lab and pap smear results will be called to the patient.  Reflex pap smear done today and will repeat in 3yrs if normal.  If abnormal, will need to repeat colposcopy.  Usual safety and preventative measures counseling done.  Will contact insurance regarding gardasil coverage.  Information brochure given.

## 2020-10-10 ASSESSMENT — ANXIETY QUESTIONNAIRES: GAD7 TOTAL SCORE: 3

## 2020-10-14 LAB
COPATH REPORT: NORMAL
PAP: NORMAL

## 2020-10-21 LAB
FINAL DIAGNOSIS: ABNORMAL
HPV HR 12 DNA CVX QL NAA+PROBE: POSITIVE
HPV16 DNA SPEC QL NAA+PROBE: NEGATIVE
HPV18 DNA SPEC QL NAA+PROBE: NEGATIVE
SPECIMEN DESCRIPTION: ABNORMAL
SPECIMEN SOURCE CVX/VAG CYTO: ABNORMAL

## 2020-10-22 ENCOUNTER — PATIENT OUTREACH (OUTPATIENT)
Dept: OBGYN | Facility: CLINIC | Age: 30
End: 2020-10-22

## 2020-10-22 DIAGNOSIS — R87.612 LGSIL OF CERVIX OF UNDETERMINED SIGNIFICANCE: ICD-10-CM

## 2020-10-22 NOTE — TELEPHONE ENCOUNTER
2013, 2016 9/20/19 LSIL pap.  Plan: colpo  12/9/19 Colpo: Bx/ECC-neg. Plan: cotest in 1 year  10/9/20 NIL pap, +HR HPV (not 16/18). Plan: cotest in 1 year

## 2021-07-02 DIAGNOSIS — Z30.41 USES ORAL CONTRACEPTION: ICD-10-CM

## 2021-07-02 RX ORDER — DROSPIRENONE AND ETHINYL ESTRADIOL 0.03MG-3MG
1 KIT ORAL DAILY
Qty: 84 TABLET | Refills: 4 | OUTPATIENT
Start: 2021-07-02

## 2021-07-02 NOTE — TELEPHONE ENCOUNTER
"Requested Prescriptions   Pending Prescriptions Disp Refills     drospirenone-ethinyl estradiol (ELADIA) 3-0.03 MG tablet 84 tablet 4     Sig: Take 1 tablet by mouth daily       Contraceptives Protocol Passed - 7/2/2021  7:50 AM        Passed - Patient is not a current smoker if age is 35 or older        Passed - Recent (12 mo) or future (30 days) visit within the authorizing provider's specialty     Patient has had an office visit with the authorizing provider or a provider within the authorizing providers department within the previous 12 mos or has a future within next 30 days. See \"Patient Info\" tab in inbasket, or \"Choose Columns\" in Meds & Orders section of the refill encounter.              Passed - Medication is active on med list        Passed - No active pregnancy on record        Passed - No positive pregnancy test in past 12 months           Last Written Prescription Date:  10/9/20  Last Fill Quantity: 84,  # refills: 4   Last office visit: 10/9/2020 with prescribing provider:  Marc   Future Office Visit:  none    Refills available  Marleny Villegas RN on 7/2/2021 at 12:19 PM        "

## 2021-09-09 ENCOUNTER — OFFICE VISIT (OUTPATIENT)
Dept: OBGYN | Facility: CLINIC | Age: 31
End: 2021-09-09
Payer: COMMERCIAL

## 2021-09-09 VITALS
WEIGHT: 111 LBS | SYSTOLIC BLOOD PRESSURE: 104 MMHG | HEIGHT: 65 IN | DIASTOLIC BLOOD PRESSURE: 62 MMHG | BODY MASS INDEX: 18.49 KG/M2

## 2021-09-09 DIAGNOSIS — Z01.419 ENCOUNTER FOR GYNECOLOGICAL EXAMINATION WITHOUT ABNORMAL FINDING: Primary | ICD-10-CM

## 2021-09-09 DIAGNOSIS — Z30.41 USES ORAL CONTRACEPTION: ICD-10-CM

## 2021-09-09 PROCEDURE — G0145 SCR C/V CYTO,THINLAYER,RESCR: HCPCS | Performed by: OBSTETRICS & GYNECOLOGY

## 2021-09-09 PROCEDURE — 99395 PREV VISIT EST AGE 18-39: CPT | Performed by: OBSTETRICS & GYNECOLOGY

## 2021-09-09 PROCEDURE — 87624 HPV HI-RISK TYP POOLED RSLT: CPT | Performed by: OBSTETRICS & GYNECOLOGY

## 2021-09-09 RX ORDER — DROSPIRENONE AND ETHINYL ESTRADIOL 0.03MG-3MG
1 KIT ORAL DAILY
Qty: 84 TABLET | Refills: 4 | Status: SHIPPED | OUTPATIENT
Start: 2021-09-09 | End: 2023-02-21

## 2021-09-09 ASSESSMENT — ANXIETY QUESTIONNAIRES
7. FEELING AFRAID AS IF SOMETHING AWFUL MIGHT HAPPEN: NOT AT ALL
2. NOT BEING ABLE TO STOP OR CONTROL WORRYING: NOT AT ALL
GAD7 TOTAL SCORE: 1
1. FEELING NERVOUS, ANXIOUS, OR ON EDGE: NOT AT ALL
3. WORRYING TOO MUCH ABOUT DIFFERENT THINGS: NOT AT ALL
IF YOU CHECKED OFF ANY PROBLEMS ON THIS QUESTIONNAIRE, HOW DIFFICULT HAVE THESE PROBLEMS MADE IT FOR YOU TO DO YOUR WORK, TAKE CARE OF THINGS AT HOME, OR GET ALONG WITH OTHER PEOPLE: NOT DIFFICULT AT ALL
6. BECOMING EASILY ANNOYED OR IRRITABLE: SEVERAL DAYS
5. BEING SO RESTLESS THAT IT IS HARD TO SIT STILL: NOT AT ALL

## 2021-09-09 ASSESSMENT — MIFFLIN-ST. JEOR: SCORE: 1221.43

## 2021-09-09 ASSESSMENT — PATIENT HEALTH QUESTIONNAIRE - PHQ9
SUM OF ALL RESPONSES TO PHQ QUESTIONS 1-9: 2
5. POOR APPETITE OR OVEREATING: NOT AT ALL

## 2021-09-09 NOTE — PATIENT INSTRUCTIONS
Follow up with your primary care provider for your other medical problems.  Continue self breast exam.  Increase physical activity and exercise.  Lab and pap smear results will be called to the patient.  Co-testing done today due to hx mildly abnormal pap smears in 2019 and 2020.  Usual safety and preventative measures counseling done.

## 2021-09-09 NOTE — PROGRESS NOTES
Lorrie is a 31 year old  female who presents for annual exam.     Besides routine health maintenance, she has no other health concerns today .    HPI:  Here today for yearly exam --doing well.  Regular, monthly menses x 5d with current OCP.  Occ brownish discharge in the days after period but no other intermenstrual bleeding/spotting.   No cramping or pain.  +SA --no issues. 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    Regular menses? yes  Menses every 28 days.  Length of menses: 5 days    Her current contraception method is: oral contraceptives.  She  reports that she has never smoked. She has never used smokeless tobacco.    Patient is sexually active.  STD testing offered?  Declined  Last PHQ-9 score on record =   PHQ-9 SCORE 2021   PHQ-9 Total Score 2     Last GAD7 score on record =   JOYCE-7 SCORE 2021   Total Score 1     Alcohol Score = 1    HEALTH MAINTENANCE:  Cholesterol: (No results found for: CHOL   Last Mammo: Not applicable, Result: Not applicable, Next Mammo: Due at age 42   Pap:   Lab Results   Component Value Date    PAP NIL 10/09/2020    PAP LSIL 2019    PAP NIL 2016   10/9/2020 WNL HPV (+)pos  Hx: , 20 LSIL pap.  Plan: colpo  19 Colpo: Bx/ECC-neg. Plan: cotest in 1 year  CURRENT 10/9/20 NIL pap, +HR HPV (not 16/18). Plan: cotest in 1 year  Colonoscopy:  NA, Result: Not applicable, Next Colonoscopy: NA years.  Dexa:   "NA    Health maintenance updated:  yes    HISTORY:  OB History    Para Term  AB Living   1 0 0 0 1 0   SAB TAB Ectopic Multiple Live Births   0 0 0 0 0      # Outcome Date GA Lbr Kenny/2nd Weight Sex Delivery Anes PTL Lv   1 AB                Patient Active Problem List   Diagnosis     Uses oral contraception     Acne     Lichen sclerosus of female genitalia     LGSIL of cervix of undetermined significance     Past Surgical History:   Procedure Laterality Date     COLPOSCOPY        Social History     Tobacco Use     Smoking status: Never Smoker     Smokeless tobacco: Never Used   Substance Use Topics     Alcohol use: Yes     Alcohol/week: 0.0 standard drinks      Problem (# of Occurrences) Relation (Name,Age of Onset)    Heart Disease (1) Maternal Grandfather    No Known Problems (6) Mother, Father, Sister, Brother, Paternal Grandmother, Other    Ovarian Cancer (1) Maternal Grandmother            Current Outpatient Medications   Medication Sig     drospirenone-ethinyl estradiol (ELADIA) 3-0.03 MG tablet Take 1 tablet by mouth daily     No current facility-administered medications for this visit.     No Known Allergies    Past medical, surgical, social and family histories were reviewed and updated in EPIC.    ROS:   12 point review of systems negative other than symptoms noted below or in the HPI.  No urinary frequency or dysuria, bladder or kidney problems, Normal menstrual cycles    EXAM:  /62   Ht 1.638 m (5' 4.5\")   Wt 50.3 kg (111 lb)   LMP 2021 (Exact Date)   Breastfeeding No   BMI 18.76 kg/m     BMI: Body mass index is 18.76 kg/m .    PHYSICAL EXAM:  Constitutional:   Appearance: Well nourished, well developed, alert, in no acute distress  Neck:  Lymph Nodes:  No lymphadenopathy present    Thyroid:  Gland size normal, nontender, no nodules or masses present  on palpation  Chest:  Respiratory Effort:  Breathing unlabored  Cardiovascular:    Heart: Auscultation:  Regular rate, normal " rhythm, no murmurs present  Breasts: Inspection of Breasts:  No lymphadenopathy present., Palpation of Breasts and Axillae:  No masses present on palpation, no breast tenderness., Axillary Lymph Nodes:  No lymphadenopathy present. and No nodularity, asymmetry or nipple discharge bilaterally.  Gastrointestinal:   Abdominal Examination:  Abdomen nontender to palpation, tone normal without rigidity or guarding, no masses present, umbilicus without lesions   Liver and Spleen:  No hepatomegaly present, liver nontender to palpation    Hernias:  No hernias present  Lymphatic: Lymph Nodes:  No other lymphadenopathy present  Skin:  General Inspection:  No rashes present, no lesions present, no areas of  discoloration  Neurologic:    Mental Status:  Oriented X3.  Normal strength and tone, sensory exam                grossly normal, mentation intact and speech normal.    Psychiatric:   Mentation appears normal and affect normal/bright.         Pelvic Exam:  External Genitalia:     Normal appearance for age, no discharge present, no tenderness present, no inflammatory lesions present, color normal  Vagina:     Normal vaginal vault without central or paravaginal defects, no discharge present, no inflammatory lesions present, no masses present  Bladder:     Nontender to palpation  Urethra:   Urethral Body:  Urethra palpation normal, urethra structural support normal   Urethral Meatus:  No erythema or lesions present  Cervix:     Appearance healthy, no lesions present, nontender to palpation, no bleeding present  Uterus:     Uterus: firm, normal sized and nontender, retroverted in position.   Adnexa:     No adnexal tenderness present, no adnexal masses present  Perineum:     Perineum within normal limits, no evidence of trauma, no rashes or skin lesions present  Anus:     Anus within normal limits, no hemorrhoids present  Inguinal Lymph Nodes:     No lymphadenopathy present  Pubic Hair:     Normal pubic hair distribution for  age  Genitalia and Groin:     No rashes present, no lesions present, no areas of discoloration, no masses present      COUNSELING:   Reviewed preventive health counseling, as reflected in patient instructions  Special attention given to:        Regular exercise       Healthy diet/nutrition       Contraception    BMI: Body mass index is 18.76 kg/m .      ASSESSMENT:  31 year old female with satisfactory annual exam.    ICD-10-CM    1. Encounter for gynecological examination without abnormal finding  Z01.419 Pap thin layer screen with HPV - recommended age 30 - 65 years   2. Uses oral contraception  Z30.41 drospirenone-ethinyl estradiol (ELADIA) 3-0.03 MG tablet       PLAN:  Patient Instructions   Follow up with your primary care provider for your other medical problems.  Continue self breast exam.  Increase physical activity and exercise.  Lab and pap smear results will be called to the patient.  Co-testing done today due to hx mildly abnormal pap smears in 2019 and 2020.  Usual safety and preventative measures counseling done.      Agustina Tran MD

## 2021-09-10 ASSESSMENT — ANXIETY QUESTIONNAIRES: GAD7 TOTAL SCORE: 1

## 2021-09-14 LAB
BKR LAB AP GYN ADEQUACY: NORMAL
BKR LAB AP GYN INTERPRETATION: NORMAL
BKR LAB AP HPV REFLEX: NORMAL
BKR LAB AP LMP: NORMAL
BKR LAB AP PREVIOUS ABNL DX: NORMAL
BKR LAB AP PREVIOUS ABNORMAL: NORMAL
PATH REPORT.COMMENTS IMP SPEC: NORMAL
PATH REPORT.RELEVANT HX SPEC: NORMAL

## 2021-09-15 LAB
HUMAN PAPILLOMA VIRUS 16 DNA: NEGATIVE
HUMAN PAPILLOMA VIRUS 18 DNA: NEGATIVE
HUMAN PAPILLOMA VIRUS FINAL DIAGNOSIS: NORMAL
HUMAN PAPILLOMA VIRUS OTHER HR: NEGATIVE

## 2021-10-04 ENCOUNTER — HEALTH MAINTENANCE LETTER (OUTPATIENT)
Age: 31
End: 2021-10-04

## 2022-09-11 ENCOUNTER — HEALTH MAINTENANCE LETTER (OUTPATIENT)
Age: 32
End: 2022-09-11

## 2023-01-22 ENCOUNTER — HEALTH MAINTENANCE LETTER (OUTPATIENT)
Age: 33
End: 2023-01-22

## 2023-02-20 NOTE — PROGRESS NOTES
"Lorrie is a 32 year old  female who presents for annual exam.     Besides routine health maintenance, she has no other health concerns today .    HPI:  Here today for yearly exam --doing well.  Missed last year's annual exam.  Stopped her OCPs last April to \"see what her natural cycles\" were.  Has had regular, monthly menses x 5d.  No intermenstrual bleeding/spotting.  Minimal cramping.  +SA --no issues.  Uses natural family planning/yolis tracking.  No bowel/bladder isssues    In relationship --has been with partner now for >5yrs --going well; still living on her own in [facility]; working in research fellowship through [employer] --hoping to finish up in  and then will look for other work  -staying active  +SBE --no issues  No PCP --no other medical issues/concerrns  -hx LGSIL in  with negative colposcopy, had LGSIL and +other HRHPV in  and normal co-testing in   -declines flu and covid vaccine today      GYNECOLOGIC HISTORY:    Patient's last menstrual period was 2023.    Regular menses? yes  Menses every 28 days.  Length of menses: 5 days    Her current contraception method is: natural family planning.  She  reports that she has never smoked. She has never used smokeless tobacco.    Patient is sexually active.  STD testing offered?  Declined  Last PHQ-9 score on record =   PHQ-9 SCORE 2023   PHQ-9 Total Score 3     Last GAD7 score on record =   JOYCE-7 SCORE 2023   Total Score 4     Alcohol Score = 2    HEALTH MAINTENANCE:  Cholesterol: (No results found for: CHOL   Last Mammo: Not applicable, Result: Not applicable, Next Mammo: Due at age 42   Pap: (  Lab Results   Component Value Date    GYNINTERP  2021     Negative for Intraepithelial Lesion or Malignancy (NILM)    PAP NIL 10/09/2020    PAP LSIL 2019    PAP NIL 2016    )  HPV Neg  Colonoscopy:  Never, Result: Not applicable, Next Colonoscopy: due at 47 years.  Dexa:  Never    Health " "maintenance updated:  Due- C Booster, Tdap, Flu-declined    HISTORY:  OB History    Para Term  AB Living   1 0 0 0 1 0   SAB IAB Ectopic Multiple Live Births   0 0 0 0 0      # Outcome Date GA Lbr Kenny/2nd Weight Sex Delivery Anes PTL Lv   1 AB                Patient Active Problem List   Diagnosis     Uses oral contraception     Acne     Lichen sclerosus of female genitalia     LGSIL of cervix of undetermined significance     Past Surgical History:   Procedure Laterality Date     COLPOSCOPY        Social History     Tobacco Use     Smoking status: Never     Smokeless tobacco: Never   Substance Use Topics     Alcohol use: Yes     Alcohol/week: 0.0 standard drinks      Problem (# of Occurrences) Relation (Name,Age of Onset)    Heart Disease (1) Maternal Grandfather    Ovarian Cancer (1) Maternal Grandmother    No Known Problems (6) Mother, Father, Sister, Brother, Paternal Grandmother, Other            No current outpatient medications on file.     No current facility-administered medications for this visit.     No Known Allergies    Past medical, surgical, social and family histories were reviewed and updated in EPIC.    ROS:   12 point review of systems negative other than symptoms noted below or in the HPI.  No urinary frequency or dysuria, bladder or kidney problems, Normal menstrual cycles    EXAM:  /54   Ht 1.651 m (5' 5\")   Wt 52.2 kg (115 lb)   LMP 2023   BMI 19.14 kg/m     BMI: Body mass index is 19.14 kg/m .    PHYSICAL EXAM:  Constitutional:   Appearance: Well nourished, well developed, alert, in no acute distress  Neck:  Lymph Nodes:  No lymphadenopathy present    Thyroid:  Gland size normal, nontender, no nodules or masses present  on palpation  Chest:  Respiratory Effort:  Breathing unlabored  Cardiovascular:    Heart: Auscultation:  Regular rate, normal rhythm, no murmurs present  Breasts: Palpation of Breasts and Axillae:  No masses present on palpation, no breast " tenderness., Axillary Lymph Nodes:  No lymphadenopathy present. and No nodularity, asymmetry or nipple discharge bilaterally.  Gastrointestinal:   Abdominal Examination:  Abdomen nontender to palpation, tone normal without rigidity or guarding, no masses present, umbilicus without lesions   Liver and Spleen:  No hepatomegaly present, liver nontender to palpation    Hernias:  No hernias present  Lymphatic: Lymph Nodes:  No other lymphadenopathy present  Skin:  General Inspection:  No rashes present, no lesions present, no areas of  discoloration  Neurologic:    Mental Status:  Oriented X3.  Normal strength and tone, sensory exam                grossly normal, mentation intact and speech normal.    Psychiatric:   Mentation appears normal and affect normal/bright.         Pelvic Exam:  External Genitalia:     Normal appearance for age, no discharge present, no tenderness present, no inflammatory lesions present, color normal  Vagina:     Normal vaginal vault without central or paravaginal defects, no discharge present, no inflammatory lesions present, no masses present  Bladder:     Nontender to palpation  Urethra:   Urethral Body:  Urethra palpation normal, urethra structural support normal   Urethral Meatus:  No erythema or lesions present  Cervix:     Appearance healthy, no lesions present, nontender to palpation, no bleeding present  Uterus:     Uterus: firm, normal sized and nontender, anteverted in position.   Adnexa:     No adnexal tenderness present, no adnexal masses present  Perineum:     Perineum within normal limits, no evidence of trauma, no rashes or skin lesions present  Anus:     Anus within normal limits, no hemorrhoids present  Inguinal Lymph Nodes:     No lymphadenopathy present  Pubic Hair:     Normal pubic hair distribution for age  Genitalia and Groin:     No rashes present, no lesions present, no areas of discoloration, no masses present      COUNSELING:   Reviewed preventive health counseling,  as reflected in patient instructions  Special attention given to:        Regular exercise       Healthy diet/nutrition       Contraception       Family planning    BMI: Body mass index is 19.14 kg/m .      ASSESSMENT:  32 year old female with satisfactory annual exam.    ICD-10-CM    1. Encounter for gynecological examination without abnormal finding  Z01.419           PLAN:  Patient Instructions   Follow up with your primary care provider for your other medical problems.  Continue self breast exam.  Increase physical activity and exercise.  Usual safety and preventative measures counseling done.  Flu Shot and covid booster declined today.  Last pap smear (2021) was normal and negative for the DNA of high risk HPV subtypes.  No pap was obtained this year but will repeat next year due to history of LGSIL.  This was discussed with the patient and she agrees with the plan.       Agustina Tran MD

## 2023-02-21 ENCOUNTER — OFFICE VISIT (OUTPATIENT)
Dept: OBGYN | Facility: CLINIC | Age: 33
End: 2023-02-21
Payer: COMMERCIAL

## 2023-02-21 VITALS
DIASTOLIC BLOOD PRESSURE: 54 MMHG | BODY MASS INDEX: 19.16 KG/M2 | SYSTOLIC BLOOD PRESSURE: 114 MMHG | WEIGHT: 115 LBS | HEIGHT: 65 IN

## 2023-02-21 DIAGNOSIS — Z01.419 ENCOUNTER FOR GYNECOLOGICAL EXAMINATION WITHOUT ABNORMAL FINDING: Primary | ICD-10-CM

## 2023-02-21 PROCEDURE — 99395 PREV VISIT EST AGE 18-39: CPT | Performed by: OBSTETRICS & GYNECOLOGY

## 2023-02-21 ASSESSMENT — ANXIETY QUESTIONNAIRES
7. FEELING AFRAID AS IF SOMETHING AWFUL MIGHT HAPPEN: NOT AT ALL
IF YOU CHECKED OFF ANY PROBLEMS ON THIS QUESTIONNAIRE, HOW DIFFICULT HAVE THESE PROBLEMS MADE IT FOR YOU TO DO YOUR WORK, TAKE CARE OF THINGS AT HOME, OR GET ALONG WITH OTHER PEOPLE: NOT DIFFICULT AT ALL
3. WORRYING TOO MUCH ABOUT DIFFERENT THINGS: SEVERAL DAYS
2. NOT BEING ABLE TO STOP OR CONTROL WORRYING: SEVERAL DAYS
GAD7 TOTAL SCORE: 4
1. FEELING NERVOUS, ANXIOUS, OR ON EDGE: SEVERAL DAYS
GAD7 TOTAL SCORE: 4
6. BECOMING EASILY ANNOYED OR IRRITABLE: SEVERAL DAYS
5. BEING SO RESTLESS THAT IT IS HARD TO SIT STILL: NOT AT ALL

## 2023-02-21 ASSESSMENT — PATIENT HEALTH QUESTIONNAIRE - PHQ9
5. POOR APPETITE OR OVEREATING: NOT AT ALL
SUM OF ALL RESPONSES TO PHQ QUESTIONS 1-9: 3

## 2023-02-21 NOTE — PATIENT INSTRUCTIONS
Follow up with your primary care provider for your other medical problems.  Continue self breast exam.  Increase physical activity and exercise.  Usual safety and preventative measures counseling done.  Flu Shot and covid booster declined today.  Last pap smear (2021) was normal and negative for the DNA of high risk HPV subtypes.  No pap was obtained this year but will repeat next year due to history of LGSIL.  This was discussed with the patient and she agrees with the plan.

## 2024-05-04 ENCOUNTER — HEALTH MAINTENANCE LETTER (OUTPATIENT)
Age: 34
End: 2024-05-04

## 2024-06-06 ENCOUNTER — TELEPHONE (OUTPATIENT)
Dept: OBGYN | Facility: CLINIC | Age: 34
End: 2024-06-06
Payer: COMMERCIAL

## 2024-06-06 NOTE — TELEPHONE ENCOUNTER
[facility] Call Center    Phone Message    May a detailed message be left on voicemail: yes     Reason for Call: Other: Pt called because she was wanting to speak with a nurse because she accidentally left her tampon in for 24 hours and she is wondering if there is anything she should be concerned about. Writer was unable to reach a nurse via secure chat. Please contact pt to discuss.     Action Taken: Message routed to:  Other: [facility]    Travel Screening: Not Applicable     Date of Service:

## 2024-06-06 NOTE — TELEPHONE ENCOUNTER
Discussed s/s that would warrant evaluation. No concerns at this time.   Questions answered.   Marleny Villegas RN on 6/6/2024 at 10:49 AM

## 2024-08-16 NOTE — PROGRESS NOTES
"Lorrie is a 34 year old  female who presents for annual exam.     Besides routine health maintenance, she has no other health concerns today .    HPI:  Here today for yearly exam --doing well.  Has been off OCPs now for nearly 2 years --regular, monthly menses x 5d.  Very clockwork.  Heavier bleeding in first 1-2d and then tapers quickly.  Occ spotting with ovulation.  No cramping.  +SA --no issues.  Using natural family planning.  No bowel/bladder issues    Moved in with boyfriend of 6.5yrs this past month --going well; now working for emergency division of [employer] --was working out of [place] x 6 months and now working remotely from [place]  -staying active with walking, swimming, etc.  Hoping to get back into exercise routine now that she is home   +SBE --no issues  No PCP --no other active medical issues  -hx LGSIL in 2019, LGSIL nad +other HPV in  and normal co-testing in ; repeat co-testing today  -no active plans to start a family but wondering about what that looks like and any pre-conception recommendations if they were to try      GYNECOLOGIC HISTORY:    Patient's last menstrual period was 2024 (exact date).    Regular menses? yes  Menses every 30 days.  Length of menses: 4 days    Her current contraception method is: natural family planning.  She  reports that she has never smoked. She has never used smokeless tobacco.    Patient is sexually active.  STD testing offered?  Declined  Last PHQ-9 score on record =       2024    11:54 AM   PHQ-9 SCORE   PHQ-9 Total Score 2     Last GAD7 score on record =       2024    11:54 AM   JOYCE-7 SCORE   Total Score 5     Alcohol Score = 1    HEALTH MAINTENANCE:  Cholesterol: (No results found for: \"CHOL\"   Last Mammo: Not applicable, Result: Not applicable, Next Mammo: Due at age 42   Pap:   Lab Results   Component Value Date    GYNINTERP  2021     Negative for Intraepithelial Lesion or Malignancy (NILM)    PAP NIL " "10/09/2020    PAP LSIL 2019    PAP NIL 2016 WNL HPV (-)neg  Colonoscopy:  NA, Result: Not applicable, Next Colonoscopy: NA years.  Dexa:  NA    Health maintenance updated:  yes    HISTORY:  OB History    Para Term  AB Living   1 0 0 0 1 0   SAB IAB Ectopic Multiple Live Births   0 0 0 0 0      # Outcome Date GA Lbr Kenny/2nd Weight Sex Type Anes PTL Lv   1 AB                Patient Active Problem List   Diagnosis    Acne    Lichen sclerosus of female genitalia    LGSIL of cervix of undetermined significance     Past Surgical History:   Procedure Laterality Date    COLPOSCOPY        Social History     Tobacco Use    Smoking status: Never    Smokeless tobacco: Never   Substance Use Topics    Alcohol use: Yes     Alcohol/week: 0.0 standard drinks of alcohol      Problem (# of Occurrences) Relation (Name,Age of Onset)    Heart Disease (1) Maternal Grandfather    Thyroid nodules (1) Mother: removed 2024    Ovarian Cancer (1) Maternal Grandmother    No Known Problems (5) Father, Sister, Brother, Paternal Grandmother, Other              No current outpatient medications on file.     No current facility-administered medications for this visit.     No Known Allergies    Past medical, surgical, social and family histories were reviewed and updated in EPIC.    ROS:   12 point review of systems negative other than symptoms noted below or in the HPI.  No urinary frequency or dysuria, bladder or kidney problems, Normal menstrual cycles    EXAM:  /58   Ht 1.638 m (5' 4.5\")   Wt 51.3 kg (113 lb)   LMP 2024 (Exact Date)   BMI 19.10 kg/m     BMI: Body mass index is 19.1 kg/m .    PHYSICAL EXAM:  Constitutional:   Appearance: Well nourished, well developed, alert, in no acute distress  Neck:  Lymph Nodes:  No lymphadenopathy present    Thyroid:  Gland size normal, nontender, no nodules or masses present  on palpation  Chest:  Respiratory Effort:  Breathing " unlabored  Cardiovascular:    Heart: Auscultation:  Regular rate, normal rhythm, no murmurs present  Breasts: Palpation of Breasts and Axillae:  No masses present on palpation, no breast tenderness., Axillary Lymph Nodes:  No lymphadenopathy present., and No nodularity, asymmetry or nipple discharge bilaterally.  Gastrointestinal:   Abdominal Examination:  Abdomen nontender to palpation, tone normal without rigidity or guarding, no masses present, umbilicus without lesions   Liver and Spleen:  No hepatomegaly present, liver nontender to palpation    Hernias:  No hernias present  Lymphatic: Lymph Nodes:  No other lymphadenopathy present  Skin:  General Inspection:  No rashes present, no lesions present, no areas of  discoloration  Neurologic:    Mental Status:  Oriented X3.  Normal strength and tone, sensory exam                grossly normal, mentation intact and speech normal.    Psychiatric:   Mentation appears normal and affect normal/bright.         Pelvic Exam:  External Genitalia:     Normal appearance for age, no discharge present, no tenderness present, no inflammatory lesions present, color normal  Vagina:     Normal vaginal vault without central or paravaginal defects, no discharge present, no inflammatory lesions present, no masses present  Bladder:     Nontender to palpation  Urethra:   Urethral Body:  Urethra palpation normal, urethra structural support normal   Urethral Meatus:  No erythema or lesions present  Cervix:     Appearance healthy, no lesions present, nontender to palpation, no bleeding present  Uterus:     Uterus: firm, normal sized and nontender, midplane in position.   Adnexa:     No adnexal tenderness present, no adnexal masses present  Perineum:     Perineum within normal limits, no evidence of trauma, no rashes or skin lesions present  Anus:     Anus within normal limits, no hemorrhoids present  Inguinal Lymph Nodes:     No lymphadenopathy present  Pubic Hair:     Normal pubic hair  distribution for age  Genitalia and Groin:     No rashes present, no lesions present, no areas of discoloration, no masses present    COUNSELING:   Reviewed preventive health counseling, as reflected in patient instructions  Special attention given to:        Regular exercise       Healthy diet/nutrition       Contraception       Family planning       Folic Acid    BMI: Body mass index is 19.1 kg/m .      ASSESSMENT:  34 year old female with satisfactory annual exam.    ICD-10-CM    1. Encounter for gynecological examination without abnormal finding  Z01.419 HPV and Gynecologic Cytology Panel - Recommended Age 30 - 65 Years          PLAN:  Patient Instructions   Follow up with your primary care provider for your other medical problems.  Continue self breast exam.  Increase physical activity and exercise.  Lab and pap smear results will be called to the patient.  Co-testing done today due to history of mildly abnormal pap smear.  Usual safety and preventative measures counseling done.       Agustina Tran MD

## 2024-08-21 ENCOUNTER — OFFICE VISIT (OUTPATIENT)
Dept: OBGYN | Facility: CLINIC | Age: 34
End: 2024-08-21
Attending: OBSTETRICS & GYNECOLOGY
Payer: COMMERCIAL

## 2024-08-21 VITALS
BODY MASS INDEX: 18.83 KG/M2 | DIASTOLIC BLOOD PRESSURE: 58 MMHG | WEIGHT: 113 LBS | SYSTOLIC BLOOD PRESSURE: 106 MMHG | HEIGHT: 65 IN

## 2024-08-21 DIAGNOSIS — Z01.419 ENCOUNTER FOR GYNECOLOGICAL EXAMINATION WITHOUT ABNORMAL FINDING: Primary | ICD-10-CM

## 2024-08-21 PROCEDURE — 99395 PREV VISIT EST AGE 18-39: CPT | Performed by: OBSTETRICS & GYNECOLOGY

## 2024-08-21 PROCEDURE — 87624 HPV HI-RISK TYP POOLED RSLT: CPT | Performed by: OBSTETRICS & GYNECOLOGY

## 2024-08-21 PROCEDURE — G0145 SCR C/V CYTO,THINLAYER,RESCR: HCPCS | Performed by: OBSTETRICS & GYNECOLOGY

## 2024-08-21 ASSESSMENT — PATIENT HEALTH QUESTIONNAIRE - PHQ9
SUM OF ALL RESPONSES TO PHQ QUESTIONS 1-9: 2
5. POOR APPETITE OR OVEREATING: SEVERAL DAYS

## 2024-08-21 ASSESSMENT — ANXIETY QUESTIONNAIRES
5. BEING SO RESTLESS THAT IT IS HARD TO SIT STILL: NOT AT ALL
GAD7 TOTAL SCORE: 5
3. WORRYING TOO MUCH ABOUT DIFFERENT THINGS: SEVERAL DAYS
GAD7 TOTAL SCORE: 5
IF YOU CHECKED OFF ANY PROBLEMS ON THIS QUESTIONNAIRE, HOW DIFFICULT HAVE THESE PROBLEMS MADE IT FOR YOU TO DO YOUR WORK, TAKE CARE OF THINGS AT HOME, OR GET ALONG WITH OTHER PEOPLE: NOT DIFFICULT AT ALL
6. BECOMING EASILY ANNOYED OR IRRITABLE: SEVERAL DAYS
2. NOT BEING ABLE TO STOP OR CONTROL WORRYING: SEVERAL DAYS
1. FEELING NERVOUS, ANXIOUS, OR ON EDGE: SEVERAL DAYS
7. FEELING AFRAID AS IF SOMETHING AWFUL MIGHT HAPPEN: NOT AT ALL

## 2024-08-21 NOTE — PATIENT INSTRUCTIONS
Follow up with your primary care provider for your other medical problems.  Continue self breast exam.  Increase physical activity and exercise.  Lab and pap smear results will be called to the patient.  Co-testing done today due to history of mildly abnormal pap smear.  Usual safety and preventative measures counseling done.

## 2024-08-22 LAB
HPV HR 12 DNA CVX QL NAA+PROBE: NEGATIVE
HPV16 DNA CVX QL NAA+PROBE: NEGATIVE
HPV18 DNA CVX QL NAA+PROBE: NEGATIVE
HUMAN PAPILLOMA VIRUS FINAL DIAGNOSIS: NORMAL

## 2024-08-27 LAB
BKR AP ASSOCIATED HPV REPORT: NORMAL
BKR LAB AP GYN ADEQUACY: NORMAL
BKR LAB AP GYN INTERPRETATION: NORMAL
BKR LAB AP PREVIOUS ABNL DX: NORMAL
BKR LAB AP PREVIOUS ABNORMAL: NORMAL
PATH REPORT.COMMENTS IMP SPEC: NORMAL
PATH REPORT.COMMENTS IMP SPEC: NORMAL
PATH REPORT.RELEVANT HX SPEC: NORMAL

## 2024-08-29 PROBLEM — R87.612 LGSIL OF CERVIX OF UNDETERMINED SIGNIFICANCE: Status: ACTIVE | Noted: 2019-09-20
